# Patient Record
Sex: MALE
[De-identification: names, ages, dates, MRNs, and addresses within clinical notes are randomized per-mention and may not be internally consistent; named-entity substitution may affect disease eponyms.]

---

## 2020-01-01 ENCOUNTER — EXTERNAL RECORD (OUTPATIENT)
Dept: HEALTH INFORMATION MANAGEMENT | Facility: OTHER | Age: 65
End: 2020-01-01

## 2020-05-27 ENCOUNTER — TELEPHONE (OUTPATIENT)
Dept: CARDIOLOGY | Age: 65
End: 2020-05-27

## 2020-06-12 ENCOUNTER — APPOINTMENT (OUTPATIENT)
Dept: CARDIOLOGY | Age: 65
End: 2020-06-12

## 2020-06-18 ENCOUNTER — TELEPHONE (OUTPATIENT)
Dept: CARDIOLOGY | Age: 65
End: 2020-06-18

## 2020-06-19 ENCOUNTER — OFFICE VISIT (OUTPATIENT)
Dept: CARDIOLOGY | Age: 65
End: 2020-06-19

## 2020-06-19 VITALS
DIASTOLIC BLOOD PRESSURE: 70 MMHG | RESPIRATION RATE: 16 BRPM | HEIGHT: 63 IN | WEIGHT: 155.1 LBS | SYSTOLIC BLOOD PRESSURE: 124 MMHG | BODY MASS INDEX: 27.48 KG/M2 | HEART RATE: 76 BPM | TEMPERATURE: 98.2 F

## 2020-06-19 DIAGNOSIS — E11.9 TYPE 2 DIABETES MELLITUS WITHOUT COMPLICATION, WITHOUT LONG-TERM CURRENT USE OF INSULIN (CMD): ICD-10-CM

## 2020-06-19 DIAGNOSIS — I25.10 CORONARY ARTERIOSCLEROSIS IN NATIVE ARTERY: ICD-10-CM

## 2020-06-19 DIAGNOSIS — R07.2 PRECORDIAL PAIN: Primary | ICD-10-CM

## 2020-06-19 DIAGNOSIS — K21.9 GASTROESOPHAGEAL REFLUX DISEASE WITHOUT ESOPHAGITIS: ICD-10-CM

## 2020-06-19 DIAGNOSIS — I34.0 NONRHEUMATIC MITRAL VALVE REGURGITATION: ICD-10-CM

## 2020-06-19 DIAGNOSIS — E78.2 MIXED HYPERLIPIDEMIA: ICD-10-CM

## 2020-06-19 DIAGNOSIS — I10 ESSENTIAL HYPERTENSION: ICD-10-CM

## 2020-06-19 DIAGNOSIS — Z95.1 S/P CABG (CORONARY ARTERY BYPASS GRAFT): ICD-10-CM

## 2020-06-19 PROBLEM — I65.23 BILATERAL CAROTID ARTERY STENOSIS: Status: ACTIVE | Noted: 2020-06-19

## 2020-06-19 PROBLEM — E78.5 HYPERLIPIDEMIA: Status: ACTIVE | Noted: 2020-06-19

## 2020-06-19 PROCEDURE — 93000 ELECTROCARDIOGRAM COMPLETE: CPT | Performed by: INTERNAL MEDICINE

## 2020-06-19 PROCEDURE — 99204 OFFICE O/P NEW MOD 45 MIN: CPT | Performed by: INTERNAL MEDICINE

## 2020-06-19 RX ORDER — ATORVASTATIN CALCIUM 80 MG/1
80 TABLET, FILM COATED ORAL DAILY
COMMUNITY

## 2020-06-19 RX ORDER — OMEPRAZOLE 20 MG/1
20 CAPSULE, DELAYED RELEASE ORAL DAILY
COMMUNITY

## 2020-06-19 SDOH — HEALTH STABILITY: MENTAL HEALTH: HOW OFTEN DO YOU HAVE A DRINK CONTAINING ALCOHOL?: NEVER

## 2020-06-19 ASSESSMENT — ENCOUNTER SYMPTOMS
WEIGHT GAIN: 0
DIZZINESS: 1
CHILLS: 0
SUSPICIOUS LESIONS: 0
WEIGHT LOSS: 0
COUGH: 0
LIGHT-HEADEDNESS: 1
BRUISES/BLEEDS EASILY: 0
FEVER: 0
HEMATOCHEZIA: 0
HEMOPTYSIS: 0
ALLERGIC/IMMUNOLOGIC COMMENTS: NO NEW FOOD ALLERGIES

## 2020-06-26 ENCOUNTER — TELEPHONE (OUTPATIENT)
Dept: CARDIOLOGY | Age: 65
End: 2020-06-26

## 2020-12-21 ENCOUNTER — TELEPHONE (OUTPATIENT)
Dept: CARDIOLOGY | Age: 65
End: 2020-12-21

## 2020-12-22 ENCOUNTER — OFFICE VISIT (OUTPATIENT)
Dept: CARDIOLOGY | Age: 65
End: 2020-12-22

## 2020-12-22 ENCOUNTER — TELEPHONE (OUTPATIENT)
Dept: CARDIOLOGY | Age: 65
End: 2020-12-22

## 2020-12-22 VITALS
HEIGHT: 63 IN | DIASTOLIC BLOOD PRESSURE: 60 MMHG | SYSTOLIC BLOOD PRESSURE: 112 MMHG | BODY MASS INDEX: 27.55 KG/M2 | TEMPERATURE: 97.5 F | RESPIRATION RATE: 16 BRPM | HEART RATE: 79 BPM | WEIGHT: 155.5 LBS

## 2020-12-22 DIAGNOSIS — I34.0 NONRHEUMATIC MITRAL VALVE REGURGITATION: ICD-10-CM

## 2020-12-22 DIAGNOSIS — I20.9 ANGINA PECTORIS (CMD): Primary | ICD-10-CM

## 2020-12-22 DIAGNOSIS — E11.9 TYPE 2 DIABETES MELLITUS WITHOUT COMPLICATION, WITHOUT LONG-TERM CURRENT USE OF INSULIN (CMD): ICD-10-CM

## 2020-12-22 DIAGNOSIS — I65.23 BILATERAL CAROTID ARTERY STENOSIS: ICD-10-CM

## 2020-12-22 DIAGNOSIS — Z95.1 S/P CABG (CORONARY ARTERY BYPASS GRAFT): ICD-10-CM

## 2020-12-22 DIAGNOSIS — E78.2 MIXED HYPERLIPIDEMIA: ICD-10-CM

## 2020-12-22 DIAGNOSIS — I25.10 CORONARY ARTERIOSCLEROSIS IN NATIVE ARTERY: ICD-10-CM

## 2020-12-22 PROCEDURE — 99214 OFFICE O/P EST MOD 30 MIN: CPT | Performed by: INTERNAL MEDICINE

## 2020-12-22 PROCEDURE — 93000 ELECTROCARDIOGRAM COMPLETE: CPT | Performed by: INTERNAL MEDICINE

## 2020-12-22 RX ORDER — METOPROLOL SUCCINATE 25 MG/1
25 TABLET, EXTENDED RELEASE ORAL DAILY
Qty: 90 TABLET | Refills: 3 | Status: SHIPPED | OUTPATIENT
Start: 2020-12-22 | End: 2021-03-23 | Stop reason: SDUPTHER

## 2020-12-22 ASSESSMENT — ENCOUNTER SYMPTOMS
DIZZINESS: 0
COUGH: 0
WEIGHT GAIN: 0
FEVER: 0
LIGHT-HEADEDNESS: 0
WEIGHT LOSS: 0
HEMOPTYSIS: 0
BRUISES/BLEEDS EASILY: 0
SUSPICIOUS LESIONS: 0
ALLERGIC/IMMUNOLOGIC COMMENTS: NO NEW FOOD ALLERGIES
CHILLS: 0
HEMATOCHEZIA: 0

## 2021-01-20 ENCOUNTER — TELEPHONE (OUTPATIENT)
Dept: CARDIOLOGY | Age: 66
End: 2021-01-20

## 2021-03-23 ENCOUNTER — APPOINTMENT (OUTPATIENT)
Dept: CARDIOLOGY | Age: 66
End: 2021-03-23

## 2021-03-23 ENCOUNTER — OFFICE VISIT (OUTPATIENT)
Dept: CARDIOLOGY | Age: 66
End: 2021-03-23

## 2021-03-23 VITALS
DIASTOLIC BLOOD PRESSURE: 62 MMHG | WEIGHT: 156.3 LBS | HEIGHT: 63 IN | SYSTOLIC BLOOD PRESSURE: 110 MMHG | HEART RATE: 74 BPM | OXYGEN SATURATION: 100 % | BODY MASS INDEX: 27.7 KG/M2

## 2021-03-23 DIAGNOSIS — I20.9 ANGINA PECTORIS (CMD): Primary | ICD-10-CM

## 2021-03-23 DIAGNOSIS — I34.0 NONRHEUMATIC MITRAL VALVE REGURGITATION: ICD-10-CM

## 2021-03-23 DIAGNOSIS — I25.10 CORONARY ARTERIOSCLEROSIS IN NATIVE ARTERY: ICD-10-CM

## 2021-03-23 DIAGNOSIS — E11.9 TYPE 2 DIABETES MELLITUS WITHOUT COMPLICATION, WITHOUT LONG-TERM CURRENT USE OF INSULIN (CMD): ICD-10-CM

## 2021-03-23 DIAGNOSIS — E78.2 MIXED HYPERLIPIDEMIA: ICD-10-CM

## 2021-03-23 DIAGNOSIS — Z95.1 S/P CABG (CORONARY ARTERY BYPASS GRAFT): ICD-10-CM

## 2021-03-23 PROCEDURE — 99214 OFFICE O/P EST MOD 30 MIN: CPT | Performed by: INTERNAL MEDICINE

## 2021-03-23 RX ORDER — METOPROLOL SUCCINATE 50 MG/1
50 TABLET, EXTENDED RELEASE ORAL DAILY
Qty: 90 TABLET | Refills: 3 | Status: SHIPPED | OUTPATIENT
Start: 2021-03-23

## 2021-03-23 RX ORDER — RAMIPRIL 1.25 MG/1
1.25 CAPSULE ORAL DAILY
Qty: 90 CAPSULE | Refills: 3 | Status: SHIPPED | OUTPATIENT
Start: 2021-03-23

## 2021-03-23 SDOH — HEALTH STABILITY: MENTAL HEALTH: HOW OFTEN DO YOU HAVE A DRINK CONTAINING ALCOHOL?: NEVER

## 2021-03-23 ASSESSMENT — ENCOUNTER SYMPTOMS
DIZZINESS: 0
LIGHT-HEADEDNESS: 0
CHILLS: 0
SUSPICIOUS LESIONS: 0
HEMATOCHEZIA: 0
FEVER: 0
COUGH: 0
WEIGHT GAIN: 0
BRUISES/BLEEDS EASILY: 0
ALLERGIC/IMMUNOLOGIC COMMENTS: NO NEW FOOD ALLERGIES
WEIGHT LOSS: 0
HEMOPTYSIS: 0

## 2021-03-25 ENCOUNTER — APPOINTMENT (OUTPATIENT)
Dept: LAB | Age: 66
End: 2021-03-25

## 2021-06-22 ENCOUNTER — OFFICE VISIT (OUTPATIENT)
Dept: CARDIOLOGY | Age: 66
End: 2021-06-22

## 2021-06-22 VITALS
HEIGHT: 63 IN | BODY MASS INDEX: 27.82 KG/M2 | WEIGHT: 157 LBS | SYSTOLIC BLOOD PRESSURE: 102 MMHG | HEART RATE: 80 BPM | DIASTOLIC BLOOD PRESSURE: 54 MMHG

## 2021-06-22 DIAGNOSIS — Z95.1 S/P CABG (CORONARY ARTERY BYPASS GRAFT): ICD-10-CM

## 2021-06-22 DIAGNOSIS — I25.10 CORONARY ARTERIOSCLEROSIS IN NATIVE ARTERY: Primary | ICD-10-CM

## 2021-06-22 DIAGNOSIS — I34.0 NONRHEUMATIC MITRAL VALVE REGURGITATION: ICD-10-CM

## 2021-06-22 DIAGNOSIS — E11.9 TYPE 2 DIABETES MELLITUS WITHOUT COMPLICATION, WITHOUT LONG-TERM CURRENT USE OF INSULIN (CMD): ICD-10-CM

## 2021-06-22 DIAGNOSIS — E78.2 MIXED HYPERLIPIDEMIA: ICD-10-CM

## 2021-06-22 PROCEDURE — 99213 OFFICE O/P EST LOW 20 MIN: CPT | Performed by: INTERNAL MEDICINE

## 2021-06-22 ASSESSMENT — PATIENT HEALTH QUESTIONNAIRE - PHQ9
SUM OF ALL RESPONSES TO PHQ9 QUESTIONS 1 AND 2: 0
CLINICAL INTERPRETATION OF PHQ2 SCORE: NO FURTHER SCREENING NEEDED
SUM OF ALL RESPONSES TO PHQ9 QUESTIONS 1 AND 2: 0
1. LITTLE INTEREST OR PLEASURE IN DOING THINGS: NOT AT ALL
CLINICAL INTERPRETATION OF PHQ9 SCORE: NO FURTHER SCREENING NEEDED
2. FEELING DOWN, DEPRESSED OR HOPELESS: NOT AT ALL

## 2021-06-22 ASSESSMENT — ENCOUNTER SYMPTOMS
SYNCOPE: 0
LIGHT-HEADEDNESS: 0
HEARTBURN: 0
NUMBNESS: 0
NEAR-SYNCOPE: 0
HEADACHES: 0
MEMORY LOSS: 0
LOSS OF BALANCE: 0
DIZZINESS: 0
DIAPHORESIS: 0
POOR WOUND HEALING: 0
COLOR CHANGE: 0
VOMITING: 0
SHORTNESS OF BREATH: 0
SNORING: 0
VERTIGO: 0
NAUSEA: 0

## 2021-11-29 PROBLEM — Z86.69 HISTORY OF SLEEP APNEA: Chronic | Status: ACTIVE | Noted: 2021-11-29

## 2021-11-30 ENCOUNTER — APPOINTMENT (OUTPATIENT)
Dept: CARDIOLOGY | Age: 66
End: 2021-11-30

## 2022-03-03 ENCOUNTER — LAB ENCOUNTER (OUTPATIENT)
Dept: LAB | Age: 67
End: 2022-03-03
Attending: INTERNAL MEDICINE
Payer: MEDICARE

## 2022-03-03 DIAGNOSIS — Z95.1 POSTSURGICAL AORTOCORONARY BYPASS STATUS: Primary | ICD-10-CM

## 2022-03-03 DIAGNOSIS — I25.10 CORONARY ARTERY DISEASE: ICD-10-CM

## 2022-03-03 DIAGNOSIS — E78.5 HYPERLIPIDEMIA: ICD-10-CM

## 2022-03-03 LAB
ALBUMIN SERPL-MCNC: 3.9 G/DL (ref 3.4–5)
ALBUMIN/GLOB SERPL: 1.3 {RATIO} (ref 1–2)
ALP LIVER SERPL-CCNC: 88 U/L
ALT SERPL-CCNC: 46 U/L
ANION GAP SERPL CALC-SCNC: 4 MMOL/L (ref 0–18)
AST SERPL-CCNC: 16 U/L (ref 15–37)
BILIRUB SERPL-MCNC: 0.5 MG/DL (ref 0.1–2)
BUN BLD-MCNC: 18 MG/DL (ref 7–18)
BUN/CREAT SERPL: 20.9 (ref 10–20)
CALCIUM BLD-MCNC: 9.4 MG/DL (ref 8.5–10.1)
CHLORIDE SERPL-SCNC: 107 MMOL/L (ref 98–112)
CHOLEST SERPL-MCNC: 137 MG/DL (ref ?–200)
CO2 SERPL-SCNC: 28 MMOL/L (ref 21–32)
CREAT BLD-MCNC: 0.86 MG/DL
FASTING PATIENT LIPID ANSWER: YES
FASTING STATUS PATIENT QL REPORTED: YES
GLOBULIN PLAS-MCNC: 2.9 G/DL (ref 2.8–4.4)
GLUCOSE BLD-MCNC: 117 MG/DL (ref 70–99)
HDLC SERPL-MCNC: 38 MG/DL (ref 40–59)
LDLC SERPL CALC-MCNC: 79 MG/DL (ref ?–100)
NONHDLC SERPL-MCNC: 99 MG/DL (ref ?–130)
OSMOLALITY SERPL CALC.SUM OF ELEC: 291 MOSM/KG (ref 275–295)
POTASSIUM SERPL-SCNC: 4.3 MMOL/L (ref 3.5–5.1)
PROT SERPL-MCNC: 6.8 G/DL (ref 6.4–8.2)
SODIUM SERPL-SCNC: 139 MMOL/L (ref 136–145)
TRIGL SERPL-MCNC: 110 MG/DL (ref 30–149)
VLDLC SERPL CALC-MCNC: 17 MG/DL (ref 0–30)

## 2022-03-03 PROCEDURE — 80053 COMPREHEN METABOLIC PANEL: CPT

## 2022-03-03 PROCEDURE — 36415 COLL VENOUS BLD VENIPUNCTURE: CPT

## 2022-03-03 PROCEDURE — 80061 LIPID PANEL: CPT

## 2022-06-04 ENCOUNTER — OFFICE VISIT (OUTPATIENT)
Dept: FAMILY MEDICINE CLINIC | Facility: CLINIC | Age: 67
End: 2022-06-04
Payer: MEDICARE

## 2022-06-04 ENCOUNTER — LAB ENCOUNTER (OUTPATIENT)
Dept: LAB | Age: 67
End: 2022-06-04
Attending: NURSE PRACTITIONER
Payer: MEDICARE

## 2022-06-04 VITALS
DIASTOLIC BLOOD PRESSURE: 73 MMHG | WEIGHT: 158 LBS | SYSTOLIC BLOOD PRESSURE: 123 MMHG | HEART RATE: 67 BPM | BODY MASS INDEX: 28.71 KG/M2 | HEIGHT: 62.4 IN

## 2022-06-04 DIAGNOSIS — Z00.00 WELL ADULT EXAM: ICD-10-CM

## 2022-06-04 DIAGNOSIS — Z12.5 SCREENING FOR PROSTATE CANCER: ICD-10-CM

## 2022-06-04 DIAGNOSIS — Z00.00 WELL ADULT EXAM: Primary | ICD-10-CM

## 2022-06-04 DIAGNOSIS — E11.9 CONTROLLED TYPE 2 DIABETES MELLITUS WITHOUT COMPLICATION, WITHOUT LONG-TERM CURRENT USE OF INSULIN (HCC): ICD-10-CM

## 2022-06-04 DIAGNOSIS — H91.93 BILATERAL HEARING LOSS, UNSPECIFIED HEARING LOSS TYPE: ICD-10-CM

## 2022-06-04 DIAGNOSIS — Z95.1 HX OF CABG: ICD-10-CM

## 2022-06-04 LAB
ALBUMIN SERPL-MCNC: 3.9 G/DL (ref 3.4–5)
ALBUMIN/GLOB SERPL: 1.2 {RATIO} (ref 1–2)
ALP LIVER SERPL-CCNC: 89 U/L
ALT SERPL-CCNC: 43 U/L
ANION GAP SERPL CALC-SCNC: 5 MMOL/L (ref 0–18)
AST SERPL-CCNC: 19 U/L (ref 15–37)
BILIRUB SERPL-MCNC: 0.6 MG/DL (ref 0.1–2)
BILIRUB UR QL: NEGATIVE
BUN BLD-MCNC: 12 MG/DL (ref 7–18)
BUN/CREAT SERPL: 14.8 (ref 10–20)
CALCIUM BLD-MCNC: 9.6 MG/DL (ref 8.5–10.1)
CHLORIDE SERPL-SCNC: 108 MMOL/L (ref 98–112)
CHOLEST SERPL-MCNC: 110 MG/DL (ref ?–200)
CLARITY UR: CLEAR
CO2 SERPL-SCNC: 27 MMOL/L (ref 21–32)
COLOR UR: YELLOW
COMPLEXED PSA SERPL-MCNC: 1.35 NG/ML (ref ?–4)
CREAT BLD-MCNC: 0.81 MG/DL
CREAT UR-SCNC: 19 MG/DL
DEPRECATED RDW RBC AUTO: 42.5 FL (ref 35.1–46.3)
ERYTHROCYTE [DISTWIDTH] IN BLOOD BY AUTOMATED COUNT: 12.5 % (ref 11–15)
EST. AVERAGE GLUCOSE BLD GHB EST-MCNC: 131 MG/DL (ref 68–126)
FASTING PATIENT LIPID ANSWER: YES
FASTING STATUS PATIENT QL REPORTED: YES
GLOBULIN PLAS-MCNC: 3.3 G/DL (ref 2.8–4.4)
GLUCOSE BLD-MCNC: 109 MG/DL (ref 70–99)
GLUCOSE UR-MCNC: NEGATIVE MG/DL
HBA1C MFR BLD: 6.2 % (ref ?–5.7)
HCT VFR BLD AUTO: 40.1 %
HDLC SERPL-MCNC: 35 MG/DL (ref 40–59)
HGB BLD-MCNC: 12.8 G/DL
HGB UR QL STRIP.AUTO: NEGATIVE
KETONES UR-MCNC: NEGATIVE MG/DL
LDLC SERPL CALC-MCNC: 58 MG/DL (ref ?–100)
LEUKOCYTE ESTERASE UR QL STRIP.AUTO: NEGATIVE
MCH RBC QN AUTO: 29.5 PG (ref 26–34)
MCHC RBC AUTO-ENTMCNC: 31.9 G/DL (ref 31–37)
MCV RBC AUTO: 92.4 FL
MICROALBUMIN UR-MCNC: <0.5 MG/DL
NITRITE UR QL STRIP.AUTO: NEGATIVE
NONHDLC SERPL-MCNC: 75 MG/DL (ref ?–130)
OSMOLALITY SERPL CALC.SUM OF ELEC: 290 MOSM/KG (ref 275–295)
PH UR: 7 [PH] (ref 5–8)
PLATELET # BLD AUTO: 168 10(3)UL (ref 150–450)
POTASSIUM SERPL-SCNC: 4.6 MMOL/L (ref 3.5–5.1)
PROT SERPL-MCNC: 7.2 G/DL (ref 6.4–8.2)
PROT UR-MCNC: NEGATIVE MG/DL
RBC # BLD AUTO: 4.34 X10(6)UL
SODIUM SERPL-SCNC: 140 MMOL/L (ref 136–145)
SP GR UR STRIP: 1.01 (ref 1–1.03)
TRIGL SERPL-MCNC: 85 MG/DL (ref 30–149)
TSI SER-ACNC: 0.77 MIU/ML (ref 0.36–3.74)
UROBILINOGEN UR STRIP-ACNC: <2
VIT B12 SERPL-MCNC: 411 PG/ML (ref 193–986)
VIT C UR-MCNC: NEGATIVE MG/DL
VLDLC SERPL CALC-MCNC: 12 MG/DL (ref 0–30)
WBC # BLD AUTO: 6.9 X10(3) UL (ref 4–11)

## 2022-06-04 PROCEDURE — 82043 UR ALBUMIN QUANTITATIVE: CPT

## 2022-06-04 PROCEDURE — 82570 ASSAY OF URINE CREATININE: CPT

## 2022-06-04 PROCEDURE — 83036 HEMOGLOBIN GLYCOSYLATED A1C: CPT

## 2022-06-04 PROCEDURE — 80053 COMPREHEN METABOLIC PANEL: CPT

## 2022-06-04 PROCEDURE — 85027 COMPLETE CBC AUTOMATED: CPT

## 2022-06-04 PROCEDURE — 36415 COLL VENOUS BLD VENIPUNCTURE: CPT

## 2022-06-04 PROCEDURE — 80061 LIPID PANEL: CPT

## 2022-06-04 PROCEDURE — 99204 OFFICE O/P NEW MOD 45 MIN: CPT | Performed by: NURSE PRACTITIONER

## 2022-06-04 PROCEDURE — 84443 ASSAY THYROID STIM HORMONE: CPT

## 2022-06-04 PROCEDURE — 81003 URINALYSIS AUTO W/O SCOPE: CPT | Performed by: NURSE PRACTITIONER

## 2022-06-04 PROCEDURE — 82607 VITAMIN B-12: CPT

## 2022-06-04 RX ORDER — RAMIPRIL 1.25 MG/1
CAPSULE ORAL
COMMUNITY
Start: 2021-03-23

## 2022-06-04 RX ORDER — ATORVASTATIN CALCIUM 80 MG/1
80 TABLET, FILM COATED ORAL DAILY
COMMUNITY
Start: 2022-05-15

## 2022-06-04 RX ORDER — OMEPRAZOLE 40 MG/1
40 CAPSULE, DELAYED RELEASE ORAL AS DIRECTED
COMMUNITY

## 2022-06-04 RX ORDER — METOPROLOL TARTRATE 50 MG/1
TABLET, FILM COATED ORAL
COMMUNITY

## 2022-06-05 PROBLEM — Z95.1 HX OF CABG: Status: ACTIVE | Noted: 2022-06-05

## 2022-06-05 PROBLEM — H91.93 BILATERAL HEARING LOSS: Status: ACTIVE | Noted: 2022-06-05

## 2022-06-05 NOTE — ASSESSMENT & PLAN NOTE
Discussed referral to audiology for hearing eval  Will consider-son is looking to add to their medicare by getting a supplement

## 2022-06-05 NOTE — ASSESSMENT & PLAN NOTE
It is essential that you decrease the amount of carbohydrates that you ingest (bread products, rice, pasta, potatoes, starchy vegetables and sugary beverages). Also try to increase the amount of vegetables and protein that you eat daily. Decrease the amount of processed and fast foods. Try to exercise at least 30 minutes per day, 4-5 times per week.    Check diabetic labs Patient/Caregiver given discharge instructions and they have confirmed that 
they understand the instructions.  Patient ambulatory with steady gait.

## 2022-06-05 NOTE — ASSESSMENT & PLAN NOTE
Please aim to eat a diet high in fresh fruits and vegetables, lean protein sources, complex carbohydrates and limited processed and fast foods. Try to get at least 150 minutes of exercise per week-a combination of weight resistance and cardio is preferred. screeing labs  Return for medicare physical  Request prior records to update care gaps.

## 2022-06-06 ENCOUNTER — PATIENT MESSAGE (OUTPATIENT)
Dept: FAMILY MEDICINE CLINIC | Facility: CLINIC | Age: 67
End: 2022-06-06

## 2022-06-06 DIAGNOSIS — H91.90 HEARING LOSS, UNSPECIFIED HEARING LOSS TYPE, UNSPECIFIED LATERALITY: Primary | ICD-10-CM

## 2022-06-07 ENCOUNTER — PATIENT MESSAGE (OUTPATIENT)
Dept: FAMILY MEDICINE CLINIC | Facility: CLINIC | Age: 67
End: 2022-06-07

## 2022-06-08 RX ORDER — BLOOD SUGAR DIAGNOSTIC
1 STRIP MISCELLANEOUS DAILY
Qty: 100 EACH | Refills: 3 | Status: SHIPPED | OUTPATIENT
Start: 2022-06-08

## 2022-06-08 RX ORDER — LANCETS 28 GAUGE
1 EACH MISCELLANEOUS DAILY
Qty: 100 EACH | Refills: 3 | Status: SHIPPED | OUTPATIENT
Start: 2022-06-08

## 2022-06-08 NOTE — TELEPHONE ENCOUNTER
From: Marsha Walsh  To: VINNIE Rosas  Sent: 6/7/2022 2:16 PM CDT  Subject: Daily Sugar Testing    Hi Jamey Cogan - My previous PCP had me test my blood sugar level on a daily basis. I have \"one touch ultra blue test strips\" and \"microlet lancets\" for another week. Can you please issue a prescription so I can continue testing daily?     Thank you

## 2022-06-17 ENCOUNTER — OFFICE VISIT (OUTPATIENT)
Dept: OTOLARYNGOLOGY | Facility: CLINIC | Age: 67
End: 2022-06-17
Payer: MEDICARE

## 2022-06-17 ENCOUNTER — OFFICE VISIT (OUTPATIENT)
Dept: AUDIOLOGY | Facility: CLINIC | Age: 67
End: 2022-06-17
Payer: MEDICARE

## 2022-06-17 VITALS — HEIGHT: 62.4 IN | WEIGHT: 158 LBS | BODY MASS INDEX: 28.71 KG/M2

## 2022-06-17 DIAGNOSIS — H90.3 SENSORINEURAL HEARING LOSS, BILATERAL: Primary | ICD-10-CM

## 2022-06-17 DIAGNOSIS — H91.90 HEARING LOSS, UNSPECIFIED HEARING LOSS TYPE, UNSPECIFIED LATERALITY: Primary | ICD-10-CM

## 2022-06-17 PROCEDURE — 92567 TYMPANOMETRY: CPT | Performed by: AUDIOLOGIST

## 2022-06-17 PROCEDURE — 92557 COMPREHENSIVE HEARING TEST: CPT | Performed by: AUDIOLOGIST

## 2022-06-17 PROCEDURE — 99203 OFFICE O/P NEW LOW 30 MIN: CPT | Performed by: OTOLARYNGOLOGY

## 2022-08-01 ENCOUNTER — APPOINTMENT (OUTPATIENT)
Dept: GENERAL RADIOLOGY | Age: 67
End: 2022-08-01
Payer: MEDICARE

## 2022-08-01 ENCOUNTER — HOSPITAL ENCOUNTER (OUTPATIENT)
Age: 67
Discharge: HOME | End: 2022-08-01
Payer: MEDICARE

## 2022-08-01 VITALS
RESPIRATION RATE: 18 BRPM | HEART RATE: 63 BPM | SYSTOLIC BLOOD PRESSURE: 120 MMHG | TEMPERATURE: 97 F | OXYGEN SATURATION: 99 % | DIASTOLIC BLOOD PRESSURE: 63 MMHG

## 2022-08-01 DIAGNOSIS — S62.111A TRIQUETRAL CHIP FRACTURE, RIGHT, CLOSED, INITIAL ENCOUNTER: ICD-10-CM

## 2022-08-01 DIAGNOSIS — L03.115 CELLULITIS OF RIGHT LOWER EXTREMITY: ICD-10-CM
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
## 2022-08-01 LAB
BILIRUB UR QL STRIP: NEGATIVE
CLARITY UR: CLEAR
COLOR UR: YELLOW
GLUCOSE UR STRIP-MCNC: NEGATIVE MG/DL
HGB UR QL STRIP: NEGATIVE
KETONES UR STRIP-MCNC: NEGATIVE MG/DL
LEUKOCYTE ESTERASE UR QL STRIP: NEGATIVE
NITRITE UR QL STRIP: NEGATIVE
PH UR STRIP: 7 [PH]
PROT UR STRIP-MCNC: NEGATIVE MG/DL
SP GR UR STRIP: 1.01
UROBILINOGEN UR STRIP-ACNC: <2 MG/DL

## 2022-08-01 PROCEDURE — 73560 X-RAY EXAM OF KNEE 1 OR 2: CPT

## 2022-08-01 PROCEDURE — 71101 X-RAY EXAM UNILAT RIBS/CHEST: CPT

## 2022-08-01 PROCEDURE — 29125 APPL SHORT ARM SPLINT STATIC: CPT

## 2022-08-01 PROCEDURE — 73030 X-RAY EXAM OF SHOULDER: CPT

## 2022-08-01 PROCEDURE — 99204 OFFICE O/P NEW MOD 45 MIN: CPT

## 2022-08-01 PROCEDURE — 73080 X-RAY EXAM OF ELBOW: CPT

## 2022-08-01 PROCEDURE — A4565 SLINGS: HCPCS

## 2022-08-01 PROCEDURE — 81002 URINALYSIS NONAUTO W/O SCOPE: CPT

## 2022-08-01 PROCEDURE — 73110 X-RAY EXAM OF WRIST: CPT

## 2022-08-01 RX ORDER — CEPHALEXIN 500 MG/1
500 CAPSULE ORAL 4 TIMES DAILY
Qty: 40 CAPSULE | Refills: 0 | Status: SHIPPED | OUTPATIENT
Start: 2022-08-01 | End: 2022-08-11

## 2022-08-02 ENCOUNTER — TELEPHONE (OUTPATIENT)
Dept: ORTHOPEDICS CLINIC | Facility: CLINIC | Age: 67
End: 2022-08-02

## 2022-08-02 NOTE — TELEPHONE ENCOUNTER
Pt called stating pt went to the immediate care 8-1-22 for chip fracture of right wrist.  Pt requesting an appointment.   Please call

## 2022-08-09 ENCOUNTER — OFFICE VISIT (OUTPATIENT)
Dept: ORTHOPEDICS CLINIC | Facility: CLINIC | Age: 67
End: 2022-08-09
Payer: MEDICARE

## 2022-08-09 DIAGNOSIS — M80.041D: ICD-10-CM

## 2022-08-09 DIAGNOSIS — S62.111A TRIQUETRAL CHIP FRACTURE, RIGHT, CLOSED, INITIAL ENCOUNTER: Primary | ICD-10-CM

## 2022-08-09 PROCEDURE — 25630 CLTX CARPL FX W/O MNPJ EA B1: CPT | Performed by: ORTHOPAEDIC SURGERY

## 2022-08-09 PROCEDURE — 99204 OFFICE O/P NEW MOD 45 MIN: CPT | Performed by: ORTHOPAEDIC SURGERY

## 2022-08-09 PROCEDURE — L3908 WHO COCK-UP NONMOLDE PRE OTS: HCPCS | Performed by: ORTHOPAEDIC SURGERY

## 2022-08-09 RX ORDER — METOPROLOL SUCCINATE 50 MG/1
50 TABLET, EXTENDED RELEASE ORAL DAILY
COMMUNITY
Start: 2022-07-10

## 2022-08-30 ENCOUNTER — OFFICE VISIT (OUTPATIENT)
Dept: ORTHOPEDICS CLINIC | Facility: CLINIC | Age: 67
End: 2022-08-30
Payer: MEDICARE

## 2022-08-30 ENCOUNTER — HOSPITAL ENCOUNTER (OUTPATIENT)
Dept: GENERAL RADIOLOGY | Facility: HOSPITAL | Age: 67
Discharge: HOME | End: 2022-08-30
Attending: ORTHOPAEDIC SURGERY
Payer: MEDICARE

## 2022-08-30 DIAGNOSIS — T14.8XXA FRACTURE: ICD-10-CM

## 2022-08-30 DIAGNOSIS — M80.041D: ICD-10-CM

## 2022-08-30 DIAGNOSIS — S62.111A TRIQUETRAL CHIP FRACTURE, RIGHT, CLOSED, INITIAL ENCOUNTER: Primary | ICD-10-CM

## 2022-08-30 PROCEDURE — 73110 X-RAY EXAM OF WRIST: CPT | Performed by: ORTHOPAEDIC SURGERY

## 2022-08-30 PROCEDURE — 99024 POSTOP FOLLOW-UP VISIT: CPT | Performed by: ORTHOPAEDIC SURGERY

## 2022-09-06 ENCOUNTER — TELEPHONE (OUTPATIENT)
Dept: PHYSICAL THERAPY | Facility: HOSPITAL | Age: 67
End: 2022-09-06

## 2022-09-07 ENCOUNTER — APPOINTMENT (OUTPATIENT)
Dept: PHYSICAL THERAPY | Age: 67
End: 2022-09-07
Attending: ORTHOPAEDIC SURGERY
Payer: MEDICARE

## 2022-09-07 ENCOUNTER — TELEPHONE (OUTPATIENT)
Dept: PHYSICAL THERAPY | Facility: HOSPITAL | Age: 67
End: 2022-09-07

## 2022-09-07 ENCOUNTER — ORDER TRANSCRIPTION (OUTPATIENT)
Dept: PHYSICAL THERAPY | Facility: HOSPITAL | Age: 67
End: 2022-09-07

## 2022-09-07 DIAGNOSIS — T14.8XXA FRACTURE: Primary | ICD-10-CM

## 2022-09-07 DIAGNOSIS — S62.111A TRIQUETRAL CHIP FRACTURE, RIGHT, CLOSED, INITIAL ENCOUNTER: ICD-10-CM

## 2022-09-12 ENCOUNTER — APPOINTMENT (OUTPATIENT)
Dept: PHYSICAL THERAPY | Age: 67
End: 2022-09-12
Attending: ORTHOPAEDIC SURGERY
Payer: MEDICARE

## 2022-09-12 ENCOUNTER — OFFICE VISIT (OUTPATIENT)
Dept: PHYSICAL THERAPY | Age: 67
End: 2022-09-12
Attending: ORTHOPAEDIC SURGERY
Payer: MEDICARE

## 2022-09-12 DIAGNOSIS — T14.8XXA FRACTURE: ICD-10-CM

## 2022-09-12 DIAGNOSIS — S62.111A TRIQUETRAL CHIP FRACTURE, RIGHT, CLOSED, INITIAL ENCOUNTER: ICD-10-CM

## 2022-09-12 PROCEDURE — 97165 OT EVAL LOW COMPLEX 30 MIN: CPT

## 2022-09-12 PROCEDURE — 97110 THERAPEUTIC EXERCISES: CPT

## 2022-09-14 ENCOUNTER — OFFICE VISIT (OUTPATIENT)
Dept: PHYSICAL THERAPY | Age: 67
End: 2022-09-14
Attending: ORTHOPAEDIC SURGERY
Payer: MEDICARE

## 2022-09-14 ENCOUNTER — APPOINTMENT (OUTPATIENT)
Dept: PHYSICAL THERAPY | Age: 67
End: 2022-09-14
Attending: ORTHOPAEDIC SURGERY
Payer: MEDICARE

## 2022-09-14 PROCEDURE — 97140 MANUAL THERAPY 1/> REGIONS: CPT

## 2022-09-14 PROCEDURE — 97110 THERAPEUTIC EXERCISES: CPT

## 2022-09-19 ENCOUNTER — OFFICE VISIT (OUTPATIENT)
Dept: PHYSICAL THERAPY | Age: 67
End: 2022-09-19
Attending: ORTHOPAEDIC SURGERY
Payer: MEDICARE

## 2022-09-19 ENCOUNTER — APPOINTMENT (OUTPATIENT)
Dept: PHYSICAL THERAPY | Age: 67
End: 2022-09-19
Attending: ORTHOPAEDIC SURGERY
Payer: MEDICARE

## 2022-09-19 PROCEDURE — 97530 THERAPEUTIC ACTIVITIES: CPT

## 2022-09-19 PROCEDURE — 97140 MANUAL THERAPY 1/> REGIONS: CPT

## 2022-09-19 PROCEDURE — 97110 THERAPEUTIC EXERCISES: CPT

## 2022-09-21 ENCOUNTER — OFFICE VISIT (OUTPATIENT)
Dept: PHYSICAL THERAPY | Age: 67
End: 2022-09-21
Attending: ORTHOPAEDIC SURGERY

## 2022-09-21 ENCOUNTER — APPOINTMENT (OUTPATIENT)
Dept: PHYSICAL THERAPY | Age: 67
End: 2022-09-21
Attending: ORTHOPAEDIC SURGERY
Payer: MEDICARE

## 2022-09-21 PROCEDURE — 97110 THERAPEUTIC EXERCISES: CPT

## 2022-09-21 PROCEDURE — 97530 THERAPEUTIC ACTIVITIES: CPT

## 2022-09-21 PROCEDURE — 97112 NEUROMUSCULAR REEDUCATION: CPT

## 2022-09-23 ENCOUNTER — OFFICE VISIT (OUTPATIENT)
Dept: PHYSICAL THERAPY | Age: 67
End: 2022-09-23
Attending: ORTHOPAEDIC SURGERY

## 2022-09-23 DIAGNOSIS — M24.611 ARTHROFIBROSIS OF RIGHT SHOULDER: ICD-10-CM

## 2022-09-23 PROCEDURE — 97162 PT EVAL MOD COMPLEX 30 MIN: CPT | Performed by: PHYSICAL THERAPIST

## 2022-09-23 PROCEDURE — 97110 THERAPEUTIC EXERCISES: CPT | Performed by: PHYSICAL THERAPIST

## 2022-09-26 ENCOUNTER — APPOINTMENT (OUTPATIENT)
Dept: PHYSICAL THERAPY | Age: 67
End: 2022-09-26
Attending: ORTHOPAEDIC SURGERY
Payer: MEDICARE

## 2022-09-26 ENCOUNTER — OFFICE VISIT (OUTPATIENT)
Dept: PHYSICAL THERAPY | Age: 67
End: 2022-09-26
Attending: ORTHOPAEDIC SURGERY

## 2022-09-26 PROCEDURE — 97530 THERAPEUTIC ACTIVITIES: CPT

## 2022-09-26 PROCEDURE — 97110 THERAPEUTIC EXERCISES: CPT

## 2022-09-26 PROCEDURE — 97140 MANUAL THERAPY 1/> REGIONS: CPT

## 2022-09-27 ENCOUNTER — OFFICE VISIT (OUTPATIENT)
Dept: PHYSICAL THERAPY | Age: 67
End: 2022-09-27
Attending: ORTHOPAEDIC SURGERY

## 2022-09-27 PROCEDURE — 97110 THERAPEUTIC EXERCISES: CPT | Performed by: PHYSICAL THERAPIST

## 2022-09-28 ENCOUNTER — APPOINTMENT (OUTPATIENT)
Dept: PHYSICAL THERAPY | Age: 67
End: 2022-09-28
Attending: ORTHOPAEDIC SURGERY
Payer: MEDICARE

## 2022-09-28 ENCOUNTER — OFFICE VISIT (OUTPATIENT)
Dept: PHYSICAL THERAPY | Age: 67
End: 2022-09-28
Attending: ORTHOPAEDIC SURGERY

## 2022-09-28 PROCEDURE — 97140 MANUAL THERAPY 1/> REGIONS: CPT

## 2022-09-28 PROCEDURE — 97110 THERAPEUTIC EXERCISES: CPT

## 2022-09-30 ENCOUNTER — OFFICE VISIT (OUTPATIENT)
Dept: PHYSICAL THERAPY | Age: 67
End: 2022-09-30
Attending: ORTHOPAEDIC SURGERY

## 2022-09-30 PROCEDURE — 97110 THERAPEUTIC EXERCISES: CPT | Performed by: PHYSICAL THERAPIST

## 2022-10-03 ENCOUNTER — OFFICE VISIT (OUTPATIENT)
Dept: PHYSICAL THERAPY | Age: 67
End: 2022-10-03
Attending: ORTHOPAEDIC SURGERY
Payer: MEDICARE

## 2022-10-03 ENCOUNTER — APPOINTMENT (OUTPATIENT)
Dept: PHYSICAL THERAPY | Age: 67
End: 2022-10-03
Attending: ORTHOPAEDIC SURGERY
Payer: MEDICARE

## 2022-10-03 PROCEDURE — 97110 THERAPEUTIC EXERCISES: CPT

## 2022-10-03 PROCEDURE — 97140 MANUAL THERAPY 1/> REGIONS: CPT

## 2022-10-04 ENCOUNTER — OFFICE VISIT (OUTPATIENT)
Dept: PHYSICAL THERAPY | Age: 67
End: 2022-10-04
Attending: ORTHOPAEDIC SURGERY
Payer: MEDICARE

## 2022-10-04 PROCEDURE — 97110 THERAPEUTIC EXERCISES: CPT | Performed by: PHYSICAL THERAPIST

## 2022-10-05 ENCOUNTER — OFFICE VISIT (OUTPATIENT)
Dept: PHYSICAL THERAPY | Age: 67
End: 2022-10-05
Attending: ORTHOPAEDIC SURGERY
Payer: MEDICARE

## 2022-10-05 ENCOUNTER — APPOINTMENT (OUTPATIENT)
Dept: PHYSICAL THERAPY | Age: 67
End: 2022-10-05
Attending: ORTHOPAEDIC SURGERY
Payer: MEDICARE

## 2022-10-05 PROCEDURE — 97110 THERAPEUTIC EXERCISES: CPT

## 2022-10-05 PROCEDURE — 97140 MANUAL THERAPY 1/> REGIONS: CPT

## 2022-10-07 ENCOUNTER — OFFICE VISIT (OUTPATIENT)
Dept: PHYSICAL THERAPY | Age: 67
End: 2022-10-07
Attending: ORTHOPAEDIC SURGERY
Payer: MEDICARE

## 2022-10-07 PROCEDURE — 97110 THERAPEUTIC EXERCISES: CPT | Performed by: PHYSICAL THERAPIST

## 2022-10-11 ENCOUNTER — OFFICE VISIT (OUTPATIENT)
Dept: PHYSICAL THERAPY | Age: 67
End: 2022-10-11
Attending: ORTHOPAEDIC SURGERY
Payer: MEDICARE

## 2022-10-11 ENCOUNTER — APPOINTMENT (OUTPATIENT)
Dept: PHYSICAL THERAPY | Age: 67
End: 2022-10-11
Payer: MEDICARE

## 2022-10-11 PROCEDURE — 97110 THERAPEUTIC EXERCISES: CPT | Performed by: PHYSICAL THERAPIST

## 2022-10-12 ENCOUNTER — OFFICE VISIT (OUTPATIENT)
Dept: PHYSICAL THERAPY | Age: 67
End: 2022-10-12
Attending: ORTHOPAEDIC SURGERY
Payer: MEDICARE

## 2022-10-12 PROCEDURE — 97140 MANUAL THERAPY 1/> REGIONS: CPT

## 2022-10-12 PROCEDURE — 97110 THERAPEUTIC EXERCISES: CPT

## 2022-10-13 ENCOUNTER — OFFICE VISIT (OUTPATIENT)
Dept: PHYSICAL THERAPY | Age: 67
End: 2022-10-13
Payer: MEDICARE

## 2022-10-13 PROCEDURE — 97530 THERAPEUTIC ACTIVITIES: CPT

## 2022-10-13 PROCEDURE — 97140 MANUAL THERAPY 1/> REGIONS: CPT

## 2022-10-13 PROCEDURE — 97110 THERAPEUTIC EXERCISES: CPT

## 2022-10-17 ENCOUNTER — OFFICE VISIT (OUTPATIENT)
Dept: PHYSICAL THERAPY | Age: 67
End: 2022-10-17
Payer: MEDICARE

## 2022-10-17 PROCEDURE — 97110 THERAPEUTIC EXERCISES: CPT | Performed by: PHYSICAL THERAPIST

## 2022-10-19 ENCOUNTER — OFFICE VISIT (OUTPATIENT)
Dept: PHYSICAL THERAPY | Age: 67
End: 2022-10-19
Payer: MEDICARE

## 2022-10-19 PROCEDURE — 97140 MANUAL THERAPY 1/> REGIONS: CPT

## 2022-10-19 PROCEDURE — 97530 THERAPEUTIC ACTIVITIES: CPT

## 2022-10-19 PROCEDURE — 97110 THERAPEUTIC EXERCISES: CPT

## 2022-10-21 ENCOUNTER — OFFICE VISIT (OUTPATIENT)
Dept: PHYSICAL THERAPY | Age: 67
End: 2022-10-21
Payer: MEDICARE

## 2022-10-21 PROCEDURE — 97530 THERAPEUTIC ACTIVITIES: CPT

## 2022-10-21 PROCEDURE — 97110 THERAPEUTIC EXERCISES: CPT

## 2022-10-21 PROCEDURE — 97140 MANUAL THERAPY 1/> REGIONS: CPT

## 2022-10-25 ENCOUNTER — OFFICE VISIT (OUTPATIENT)
Dept: PHYSICAL THERAPY | Age: 67
End: 2022-10-25
Payer: MEDICARE

## 2022-10-25 PROCEDURE — 97110 THERAPEUTIC EXERCISES: CPT | Performed by: PHYSICAL THERAPIST

## 2022-10-26 ENCOUNTER — OFFICE VISIT (OUTPATIENT)
Dept: PHYSICAL THERAPY | Age: 67
End: 2022-10-26
Payer: MEDICARE

## 2022-10-26 PROCEDURE — 97110 THERAPEUTIC EXERCISES: CPT

## 2022-10-26 PROCEDURE — 97140 MANUAL THERAPY 1/> REGIONS: CPT

## 2022-10-26 PROCEDURE — 97530 THERAPEUTIC ACTIVITIES: CPT

## 2022-10-27 ENCOUNTER — OFFICE VISIT (OUTPATIENT)
Dept: PHYSICAL THERAPY | Age: 67
End: 2022-10-27
Attending: ORTHOPAEDIC SURGERY
Payer: MEDICARE

## 2022-10-27 PROCEDURE — 97530 THERAPEUTIC ACTIVITIES: CPT | Performed by: PHYSICAL THERAPIST

## 2022-10-27 PROCEDURE — 97110 THERAPEUTIC EXERCISES: CPT | Performed by: PHYSICAL THERAPIST

## 2022-10-28 ENCOUNTER — OFFICE VISIT (OUTPATIENT)
Dept: PHYSICAL THERAPY | Age: 67
End: 2022-10-28
Payer: MEDICARE

## 2022-10-28 PROCEDURE — 97140 MANUAL THERAPY 1/> REGIONS: CPT

## 2022-10-28 PROCEDURE — 97110 THERAPEUTIC EXERCISES: CPT

## 2022-11-01 ENCOUNTER — OFFICE VISIT (OUTPATIENT)
Dept: PHYSICAL THERAPY | Age: 67
End: 2022-11-01
Payer: MEDICARE

## 2022-11-01 PROCEDURE — 97110 THERAPEUTIC EXERCISES: CPT | Performed by: PHYSICAL THERAPIST

## 2022-11-02 ENCOUNTER — OFFICE VISIT (OUTPATIENT)
Dept: PHYSICAL THERAPY | Age: 67
End: 2022-11-02
Payer: MEDICARE

## 2022-11-02 PROCEDURE — 97140 MANUAL THERAPY 1/> REGIONS: CPT

## 2022-11-02 PROCEDURE — 97530 THERAPEUTIC ACTIVITIES: CPT

## 2022-11-02 PROCEDURE — 97110 THERAPEUTIC EXERCISES: CPT

## 2022-11-03 ENCOUNTER — OFFICE VISIT (OUTPATIENT)
Dept: PHYSICAL THERAPY | Age: 67
End: 2022-11-03
Payer: MEDICARE

## 2022-11-03 PROCEDURE — 97530 THERAPEUTIC ACTIVITIES: CPT | Performed by: PHYSICAL THERAPIST

## 2022-11-03 PROCEDURE — 97110 THERAPEUTIC EXERCISES: CPT | Performed by: PHYSICAL THERAPIST

## 2022-11-04 ENCOUNTER — OFFICE VISIT (OUTPATIENT)
Dept: PHYSICAL THERAPY | Age: 67
End: 2022-11-04
Payer: MEDICARE

## 2022-11-04 PROCEDURE — 97140 MANUAL THERAPY 1/> REGIONS: CPT

## 2022-11-04 PROCEDURE — 97110 THERAPEUTIC EXERCISES: CPT

## 2022-11-08 ENCOUNTER — OFFICE VISIT (OUTPATIENT)
Dept: PHYSICAL THERAPY | Age: 67
End: 2022-11-08
Payer: MEDICARE

## 2022-11-08 PROCEDURE — 97110 THERAPEUTIC EXERCISES: CPT

## 2022-11-08 PROCEDURE — 97140 MANUAL THERAPY 1/> REGIONS: CPT

## 2022-11-09 ENCOUNTER — OFFICE VISIT (OUTPATIENT)
Dept: PHYSICAL THERAPY | Age: 67
End: 2022-11-09
Payer: MEDICARE

## 2022-11-09 PROCEDURE — 97110 THERAPEUTIC EXERCISES: CPT | Performed by: PHYSICAL THERAPIST

## 2022-11-09 PROCEDURE — 97530 THERAPEUTIC ACTIVITIES: CPT | Performed by: PHYSICAL THERAPIST

## 2022-11-10 ENCOUNTER — OFFICE VISIT (OUTPATIENT)
Dept: PHYSICAL THERAPY | Age: 67
End: 2022-11-10
Payer: MEDICARE

## 2022-11-10 PROCEDURE — 97140 MANUAL THERAPY 1/> REGIONS: CPT

## 2022-11-10 PROCEDURE — 97110 THERAPEUTIC EXERCISES: CPT

## 2022-11-11 ENCOUNTER — OFFICE VISIT (OUTPATIENT)
Dept: PHYSICAL THERAPY | Age: 67
End: 2022-11-11
Payer: MEDICARE

## 2022-11-11 PROCEDURE — 97530 THERAPEUTIC ACTIVITIES: CPT | Performed by: PHYSICAL THERAPIST

## 2022-11-11 PROCEDURE — 97110 THERAPEUTIC EXERCISES: CPT | Performed by: PHYSICAL THERAPIST

## 2022-11-15 ENCOUNTER — APPOINTMENT (OUTPATIENT)
Dept: PHYSICAL THERAPY | Age: 67
End: 2022-11-15
Payer: MEDICARE

## 2022-11-16 ENCOUNTER — APPOINTMENT (OUTPATIENT)
Dept: PHYSICAL THERAPY | Age: 67
End: 2022-11-16
Payer: MEDICARE

## 2022-11-17 ENCOUNTER — APPOINTMENT (OUTPATIENT)
Dept: PHYSICAL THERAPY | Age: 67
End: 2022-11-17
Payer: MEDICARE

## 2022-11-18 ENCOUNTER — APPOINTMENT (OUTPATIENT)
Dept: PHYSICAL THERAPY | Age: 67
End: 2022-11-18
Payer: MEDICARE

## 2023-01-12 RX ORDER — RAMIPRIL 1.25 MG/1
1.25 CAPSULE ORAL DAILY
Qty: 90 CAPSULE | Refills: 0 | Status: SHIPPED | OUTPATIENT
Start: 2023-01-12

## 2023-01-12 RX ORDER — METOPROLOL SUCCINATE 50 MG/1
50 TABLET, EXTENDED RELEASE ORAL DAILY
Qty: 90 TABLET | Refills: 0 | Status: SHIPPED | OUTPATIENT
Start: 2023-01-12

## 2023-03-03 ENCOUNTER — LAB ENCOUNTER (OUTPATIENT)
Dept: LAB | Age: 68
End: 2023-03-03
Attending: NURSE PRACTITIONER
Payer: MEDICARE

## 2023-03-03 ENCOUNTER — PATIENT MESSAGE (OUTPATIENT)
Dept: FAMILY MEDICINE CLINIC | Facility: CLINIC | Age: 68
End: 2023-03-03

## 2023-03-03 ENCOUNTER — OFFICE VISIT (OUTPATIENT)
Dept: FAMILY MEDICINE CLINIC | Facility: CLINIC | Age: 68
End: 2023-03-03

## 2023-03-03 VITALS
HEIGHT: 62.4 IN | WEIGHT: 152 LBS | HEART RATE: 62 BPM | BODY MASS INDEX: 27.62 KG/M2 | SYSTOLIC BLOOD PRESSURE: 142 MMHG | DIASTOLIC BLOOD PRESSURE: 83 MMHG

## 2023-03-03 DIAGNOSIS — E11.9 CONTROLLED TYPE 2 DIABETES MELLITUS WITHOUT COMPLICATION, WITHOUT LONG-TERM CURRENT USE OF INSULIN (HCC): ICD-10-CM

## 2023-03-03 DIAGNOSIS — Z12.11 SCREENING FOR COLON CANCER: ICD-10-CM

## 2023-03-03 DIAGNOSIS — Z12.5 SCREENING FOR PROSTATE CANCER: ICD-10-CM

## 2023-03-03 DIAGNOSIS — Z00.00 WELL ADULT EXAM: Primary | ICD-10-CM

## 2023-03-03 DIAGNOSIS — Z00.00 WELL ADULT EXAM: ICD-10-CM

## 2023-03-03 DIAGNOSIS — Z95.1 HX OF CABG: ICD-10-CM

## 2023-03-03 DIAGNOSIS — H91.93 BILATERAL HEARING LOSS, UNSPECIFIED HEARING LOSS TYPE: ICD-10-CM

## 2023-03-03 DIAGNOSIS — K21.9 GASTROESOPHAGEAL REFLUX DISEASE, UNSPECIFIED WHETHER ESOPHAGITIS PRESENT: ICD-10-CM

## 2023-03-03 LAB
ALBUMIN SERPL-MCNC: 4.1 G/DL (ref 3.4–5)
ALBUMIN/GLOB SERPL: 1.2 {RATIO} (ref 1–2)
ALP LIVER SERPL-CCNC: 93 U/L
ALT SERPL-CCNC: 34 U/L
ANION GAP SERPL CALC-SCNC: 6 MMOL/L (ref 0–18)
AST SERPL-CCNC: 18 U/L (ref 15–37)
BILIRUB SERPL-MCNC: 0.5 MG/DL (ref 0.1–2)
BUN BLD-MCNC: 25 MG/DL (ref 7–18)
BUN/CREAT SERPL: 25.5 (ref 10–20)
CALCIUM BLD-MCNC: 9.6 MG/DL (ref 8.5–10.1)
CHLORIDE SERPL-SCNC: 108 MMOL/L (ref 98–112)
CHOLEST SERPL-MCNC: 132 MG/DL (ref ?–200)
CO2 SERPL-SCNC: 28 MMOL/L (ref 21–32)
COMPLEXED PSA SERPL-MCNC: 1.68 NG/ML (ref ?–4)
CREAT BLD-MCNC: 0.98 MG/DL
CREAT UR-SCNC: 130 MG/DL
DEPRECATED RDW RBC AUTO: 42 FL (ref 35.1–46.3)
ERYTHROCYTE [DISTWIDTH] IN BLOOD BY AUTOMATED COUNT: 12.4 % (ref 11–15)
EST. AVERAGE GLUCOSE BLD GHB EST-MCNC: 134 MG/DL (ref 68–126)
FASTING PATIENT LIPID ANSWER: YES
FASTING STATUS PATIENT QL REPORTED: YES
GFR SERPLBLD BASED ON 1.73 SQ M-ARVRAT: 85 ML/MIN/1.73M2 (ref 60–?)
GLOBULIN PLAS-MCNC: 3.3 G/DL (ref 2.8–4.4)
GLUCOSE BLD-MCNC: 125 MG/DL (ref 70–99)
HBA1C MFR BLD: 6.3 % (ref ?–5.7)
HCT VFR BLD AUTO: 40.1 %
HDLC SERPL-MCNC: 36 MG/DL (ref 40–59)
HGB BLD-MCNC: 12.9 G/DL
LDLC SERPL CALC-MCNC: 79 MG/DL (ref ?–100)
MCH RBC QN AUTO: 29.5 PG (ref 26–34)
MCHC RBC AUTO-ENTMCNC: 32.2 G/DL (ref 31–37)
MCV RBC AUTO: 91.8 FL
MICROALBUMIN UR-MCNC: 0.8 MG/DL
MICROALBUMIN/CREAT 24H UR-RTO: 6.2 UG/MG (ref ?–30)
NONHDLC SERPL-MCNC: 96 MG/DL (ref ?–130)
OSMOLALITY SERPL CALC.SUM OF ELEC: 300 MOSM/KG (ref 275–295)
PLATELET # BLD AUTO: 155 10(3)UL (ref 150–450)
POTASSIUM SERPL-SCNC: 4.6 MMOL/L (ref 3.5–5.1)
PROT SERPL-MCNC: 7.4 G/DL (ref 6.4–8.2)
RBC # BLD AUTO: 4.37 X10(6)UL
SODIUM SERPL-SCNC: 142 MMOL/L (ref 136–145)
TRIGL SERPL-MCNC: 91 MG/DL (ref 30–149)
TSI SER-ACNC: 0.77 MIU/ML (ref 0.36–3.74)
VIT B12 SERPL-MCNC: 435 PG/ML (ref 193–986)
VLDLC SERPL CALC-MCNC: 14 MG/DL (ref 0–30)
WBC # BLD AUTO: 6.5 X10(3) UL (ref 4–11)

## 2023-03-03 PROCEDURE — 80053 COMPREHEN METABOLIC PANEL: CPT

## 2023-03-03 PROCEDURE — G0439 PPPS, SUBSEQ VISIT: HCPCS | Performed by: NURSE PRACTITIONER

## 2023-03-03 PROCEDURE — 84443 ASSAY THYROID STIM HORMONE: CPT

## 2023-03-03 PROCEDURE — 82570 ASSAY OF URINE CREATININE: CPT

## 2023-03-03 PROCEDURE — 36415 COLL VENOUS BLD VENIPUNCTURE: CPT

## 2023-03-03 PROCEDURE — 82043 UR ALBUMIN QUANTITATIVE: CPT

## 2023-03-03 PROCEDURE — 99213 OFFICE O/P EST LOW 20 MIN: CPT | Performed by: NURSE PRACTITIONER

## 2023-03-03 PROCEDURE — 83036 HEMOGLOBIN GLYCOSYLATED A1C: CPT

## 2023-03-03 PROCEDURE — 85027 COMPLETE CBC AUTOMATED: CPT

## 2023-03-03 PROCEDURE — 82607 VITAMIN B-12: CPT

## 2023-03-03 PROCEDURE — 80061 LIPID PANEL: CPT

## 2023-03-03 RX ORDER — ATORVASTATIN CALCIUM 80 MG/1
80 TABLET, FILM COATED ORAL DAILY
Qty: 90 TABLET | Refills: 3 | Status: SHIPPED | OUTPATIENT
Start: 2023-03-03

## 2023-03-03 RX ORDER — METOPROLOL SUCCINATE 50 MG/1
50 TABLET, EXTENDED RELEASE ORAL DAILY
Qty: 90 TABLET | Refills: 3 | Status: SHIPPED | OUTPATIENT
Start: 2023-03-03

## 2023-03-03 RX ORDER — OMEPRAZOLE 20 MG/1
20 CAPSULE, DELAYED RELEASE ORAL DAILY
Qty: 90 CAPSULE | Refills: 3 | Status: SHIPPED | OUTPATIENT
Start: 2023-03-03

## 2023-03-03 RX ORDER — RAMIPRIL 1.25 MG/1
1.25 CAPSULE ORAL DAILY
Qty: 90 CAPSULE | Refills: 3 | Status: SHIPPED | OUTPATIENT
Start: 2023-03-03

## 2023-03-03 RX ORDER — OMEPRAZOLE 20 MG/1
20 CAPSULE, DELAYED RELEASE ORAL DAILY
COMMUNITY
Start: 2023-02-07 | End: 2023-03-03

## 2023-03-03 NOTE — ASSESSMENT & PLAN NOTE
Refill ppi  Discussed low acid, low spice diet  Please call if symptoms worsen or are not resolving.

## 2023-03-03 NOTE — ASSESSMENT & PLAN NOTE
Screening labs  Referral for colonoscopy as diverticulosis was found in 2015  psa ordered  Information for advanced care planning given to pt

## 2023-03-03 NOTE — TELEPHONE ENCOUNTER
From: VINNIE Diaz  To: Nicole Martinez  Sent: 3/3/2023 9:33 AM CST  Subject: hearing loss    Mian-your dad was to follow up with an audiologist due to his hearing loss. I don't think he ever did so I am placing an order for him. Please let me know if you have any questions or concerns.    Please call [phone] for appointment  CM Diaz, JASPAL-C

## 2023-03-03 NOTE — ASSESSMENT & PLAN NOTE
Was seen by Dr Shahab Melissa in [facility] to follow up w audiology  Was seen by audiology and hearing aids were recommended-they are very expensive and chose not to get them

## 2023-05-04 ENCOUNTER — TELEPHONE (OUTPATIENT)
Dept: FAMILY MEDICINE CLINIC | Facility: CLINIC | Age: 68
End: 2023-05-04

## 2023-05-04 NOTE — TELEPHONE ENCOUNTER
Bridget from [facility]'s office is requesting a call back regarding patient's diabetic shoes. It is noted an APN cannot sign off on this request and requesting an MD name and signature. Please advise.       Phone # [phone]

## 2023-05-04 NOTE — TELEPHONE ENCOUNTER
Spoke to [facility] form Dr. Rosie Mccormack Podiatry's office. Bridget was informed Dr. Hector Victoria is Aleta's collaborating doctor. Order will be faxed over for Diabetic shoes. Bridget verbalized understanding.

## 2023-05-10 NOTE — TELEPHONE ENCOUNTER
Refill passed per [facility], [facility] protocol. External record/Pt reported    Requested Prescriptions   Pending Prescriptions Disp Refills    ASPIRIN 81 OR  0     Sig: Take by mouth daily.        Aspirin Protocol Passed - 5/9/2023  4:54 PM        Passed - In person appointment or virtual visit in the past 6 mos or appointment in next 3 mos     Recent Outpatient Visits              2 months ago Well adult exam    [address] [facility], VINNIE Cole    Office Visit    6 months ago     [facility] in [place] [address] [address] Visit    6 months ago     [facility] in [address]    6 months ago     [facility] in [place], [place]    Office Visit    6 months ago     [facility] Nova in [place] [address] De[address] Visit          Future Appointments         Provider Department Appt Notes    In 1 month GI [address], [address], [address]                    Recent Outpatient Visits              2 months ago Well adult exam    [address] [facility], VINNIE Cole    Office Visit    6 months ago     [facility] Nova in [facility], [address] [place] [address]    6 months ago     NELY Nova in [address]    6 months ago     [facility] in [place], [place]    Office Visit    6 months ago     [facility] in [place] [address] De[address]van Moshe Visit            Future Appointments         Provider Department Appt Notes    In 1 month [address], [address], [address]

## 2023-05-11 ENCOUNTER — MEDICAL CORRESPONDENCE (OUTPATIENT)
Dept: FAMILY MEDICINE CLINIC | Facility: CLINIC | Age: 68
End: 2023-05-11

## 2023-05-11 RX ORDER — ASPIRIN 81 MG/1
81 TABLET ORAL DAILY
Qty: 90 TABLET | Refills: 0 | Status: SHIPPED | OUTPATIENT
Start: 2023-05-11

## 2023-05-12 ENCOUNTER — MED REC SCAN ONLY (OUTPATIENT)
Dept: FAMILY MEDICINE CLINIC | Facility: CLINIC | Age: 68
End: 2023-05-12

## 2023-05-12 NOTE — TELEPHONE ENCOUNTER
Form was signed and faxed over to [facility] SONGPMARGO Leo at [phone]. Fax confirmation was received.

## 2023-06-13 ENCOUNTER — NURSE ONLY (OUTPATIENT)
Facility: CLINIC | Age: 68
End: 2023-06-13

## 2023-06-13 DIAGNOSIS — Z12.11 COLON CANCER SCREENING: Primary | ICD-10-CM

## 2023-07-20 RX ORDER — SODIUM, POTASSIUM,MAG SULFATES 17.5-3.13G
SOLUTION, RECONSTITUTED, ORAL ORAL
Qty: 1 EACH | Refills: 0 | Status: SHIPPED | OUTPATIENT
Start: 2023-07-20

## 2023-07-20 NOTE — PROGRESS NOTES
Dr Afshan Dominguez-    Please advise on orders for patient's Colonoscopy. Please see TE below. Thank you!

## 2023-07-20 NOTE — PROGRESS NOTES
Dx: average risk crc screening, had colonoscopy 5 years ago. Colonoscopy with MAC sedation  Split dose suprep, if suprep not covered then golytely or moviprep, sent to pharmacy  Please review prep instructions with patient    **SUTAB SENT    - If the patient is taking oral diabetic medications then they should HOLD diabetic medications the night before and/or the day of the procedure - If the patient is on insulin, please have the PCP or endocrinologist assist with management of dosing prior to the procedure.  - NO herbal supplements or weight loss medications x 7 days prior to the procedure. - If patient is taking Xarelto OR Eliquis then it needs to be helf for 48 hours prior to the procedure --> Should get approval from the prescribing physician (PCP or cardiologist) to hold this medication prior to the procedure. - If the patient is taking Coumadin then it needs to be on hold Coumadin for 5 days prior to the procedure --> Should get approval from the prescribing physician (PCP or cardiologist) to hold this medication prior to the procedure. *If the bowel prep prescribed is too expensive/not covered by the patient's insurance please pend an alternative and I will sign that order. Thank you.

## 2023-07-21 ENCOUNTER — PATIENT MESSAGE (OUTPATIENT)
Dept: GASTROENTEROLOGY | Facility: CLINIC | Age: 68
End: 2023-07-21

## 2023-07-21 RX ORDER — SOD SULF/POT CHLORIDE/MAG SULF 1.479 G
24 TABLET ORAL AS DIRECTED
Qty: 24 TABLET | Refills: 0 | Status: SHIPPED | OUTPATIENT
Start: 2023-07-21 | End: 2023-07-22

## 2023-07-21 NOTE — PROGRESS NOTES
Scheduled for:  Colonoscopy-screen [address]    Provider Name:  Dr. Cheryl Turcios  Date:  Thursday, 11/9/2023  Location:  [facility]  Sedation:  MAC  Time:  12:30 PM Patient made aware [facility] will call the day before with an arrival time. Prep:  Split dose ? (PATIENT IS REQUESTING SUTAB)   Meds/Allergies Reconciled?:  Physician reviewed   Diagnosis with codes:  Colorectal cancer screening Z12.11  Was patient informed to call insurance with codes (Y/N):  I confirmed Medicare insurance with this patient. Referral sent?:  Referral was sent. [facility] or [address] notified?: Electronic case request was sent to [email or web address] via [employer]. Medication Orders:  HOLD diabetic medications the night before and/or the day of the procedure. DO NOT TAKE: Iron pills, herbal supplements, multi-vitamins, or diet medications (i.e. Phentermine/Vyvanse) for 7 days before exam.  Misc Orders:  Patient was informed about the new cancellation policy for his/her procedure. Patient was also given a copy of the cancellation policy at the time of the appointment and verbalized understanding. Further instructions given by staff:  Once prep type is determined I will then send instructions via [employer].

## 2023-07-21 NOTE — PROGRESS NOTES
Hi Dr. Juve Joyce,   A message was sent to me but it is blank- please advise on message before.  Thank you

## 2023-07-21 NOTE — PROGRESS NOTES
Terry Lackey,    Patient is requesting SUTAB as his desired prep. Please advise, if appropriate.      Thank you

## 2023-08-16 NOTE — TELEPHONE ENCOUNTER
From: Vy Stoner  Sent: 8/15/2023 2:58 PM CDT  To: [facility] Clinical Staff  Subject: Split Dose SUTAB Prep Tablets for Colonoscopy    Hello,    Was this operation canceled? It is no longer in my [facility] calendar.

## 2023-10-01 DIAGNOSIS — Z95.1 HX OF CABG: ICD-10-CM

## 2023-10-02 NOTE — TELEPHONE ENCOUNTER
Please review; protocol failed. No active /future labs noted   Message sent for patient to make an appointment. Requested Prescriptions   Pending Prescriptions Disp Refills    RAMIPRIL 1.25 MG Oral Cap [Pharmacy Med Name: RAMIPRIL 1.25 MG CAPSULE] 90 capsule 3     Sig: TAKE 1 CAPSULE BY MOUTH DAILY. Hypertensive Medications Protocol Failed - 10/1/2023  7:04 AM        Failed - Last BP reading less than 140/90     BP Readings from Last 1 Encounters:  03/03/23 : 142/83              Failed - CMP or BMP in past 6 months     No results found for this or any previous visit (from the past 4392 hour(s)).             Failed - In person appointment or virtual visit in the past 6 months     Recent Outpatient Visits              3 months ago Colon cancer screening    [address], [address], [place]    Nurse Only    7 months ago Well adult exam    [address] [facility], VINNIE Campo    Office Visit    10 months ago     [facility] in [address] Visit    10 months ago     [facility] in [address]    10 months ago     [facility] in [place], [place]    Office Visit          Future Appointments         Provider Department Appt Notes    In 1 month [address] [facility], [address], [address], [place] Little @ [facility] - In person appointment in the past 12 or next 3 months     Recent Outpatient Visits              3 months ago Colon cancer screening    [address], [address], [place]    Nurse Only    7 months ago Well adult exam    [address], [address], VINNIE Campo    Office Visit    10 months ago     [facility] in [facility], [address] [place] [address]    10 months ago     [facility] in [facility] Kaleb Costello [facility]    Office Visit    10 months ago     [facility] in [place], [place]    Office Visit          Future Appointments         Provider Department Appt Notes    In 1 month [facility], [address], [place] [facility] @ [facility] or GFRNAA > 50     GFR Evaluation  EGFRCR: 85 , resulted on 3/3/2023             Recent Outpatient Visits              3 months ago Colon cancer screening    Agnieszka Andrade, [address], [place]    Nurse Only    7 months ago Well adult exam    [address] [facility], Eduardo Meraz Patient, APRN    Office Visit    10 months ago     [facility] in [facility], [address] [place] [address] [place] Visit    10 months ago     [facility] in [facility] Kaleb Costello[address]    10 months ago     [facility] in [place], [place]    Office Visit          Future Appointments         Provider Department Appt Notes    In 1 month [address] [facility] [address], [address], [address], [place] Little @ [employer]

## 2023-10-03 RX ORDER — RAMIPRIL 1.25 MG/1
1.25 CAPSULE ORAL DAILY
Qty: 90 CAPSULE | Refills: 0 | Status: SHIPPED | OUTPATIENT
Start: 2023-10-03

## 2023-10-16 ENCOUNTER — TELEPHONE (OUTPATIENT)
Facility: CLINIC | Age: 68
End: 2023-10-16

## 2023-10-19 ENCOUNTER — LAB ENCOUNTER (OUTPATIENT)
Dept: LAB | Age: 68
End: 2023-10-19
Attending: INTERNAL MEDICINE
Payer: MEDICARE

## 2023-10-19 DIAGNOSIS — I25.10 CORONARY ATHEROSCLEROSIS OF NATIVE CORONARY ARTERY: Primary | ICD-10-CM

## 2023-10-19 DIAGNOSIS — E78.5 HYPERLIPEMIA: ICD-10-CM

## 2023-10-19 LAB
ALT SERPL-CCNC: 33 U/L
AST SERPL-CCNC: 14 U/L (ref 15–37)
CHOLEST SERPL-MCNC: 119 MG/DL (ref ?–200)
FASTING PATIENT LIPID ANSWER: YES
HDLC SERPL-MCNC: 44 MG/DL (ref 40–59)
LDLC SERPL CALC-MCNC: 48 MG/DL (ref ?–100)
NONHDLC SERPL-MCNC: 75 MG/DL (ref ?–130)
TRIGL SERPL-MCNC: 161 MG/DL (ref 30–149)
VLDLC SERPL CALC-MCNC: 23 MG/DL (ref 0–30)

## 2023-10-19 PROCEDURE — 80061 LIPID PANEL: CPT

## 2023-10-19 PROCEDURE — 36415 COLL VENOUS BLD VENIPUNCTURE: CPT

## 2023-10-19 PROCEDURE — 84460 ALANINE AMINO (ALT) (SGPT): CPT

## 2023-10-19 PROCEDURE — 84450 TRANSFERASE (AST) (SGOT): CPT

## 2023-11-08 ENCOUNTER — TELEPHONE (OUTPATIENT)
Facility: CLINIC | Age: 68
End: 2023-11-08

## 2023-11-08 NOTE — TELEPHONE ENCOUNTER
Pt's son wanted to know his time for tomorrow's procedure at [address]    I called and spoke to [address] at [address]    She told me pt has already been notified and RN spoke to him. Time is 10:45 for 12:00 pm procedure. I spoke to pt's son and gave him the time.  He states the RN probably spoke to his father    Pt's son voices understanding of correct time and states his father has his bowel prep instructions

## 2023-11-09 PROBLEM — Z12.11 SPECIAL SCREENING FOR MALIGNANT NEOPLASMS, COLON: Status: ACTIVE | Noted: 2023-03-03

## 2023-11-09 PROCEDURE — 88305 TISSUE EXAM BY PATHOLOGIST: CPT | Performed by: STUDENT IN AN ORGANIZED HEALTH CARE EDUCATION/TRAINING PROGRAM

## 2023-11-10 ENCOUNTER — TELEPHONE (OUTPATIENT)
Facility: CLINIC | Age: 68
End: 2023-11-10

## 2023-11-10 NOTE — TELEPHONE ENCOUNTER
----- Message from Francois Dunham MD sent at 11/10/2023  2:50 PM CST -----  2 adenomatous polyps removed on recent colonoscopy -- should have 5 year follow up. Mychart sent to patient. GI Staff:    Can you please place recall for this patient to have a colonoscopy in 5 years. Thank you.     Francois Dunham MD

## 2023-11-10 NOTE — TELEPHONE ENCOUNTER
Recall colon in 5 years per Dr Bud Pappas.  Colon done 11/09/2023    Health maintenance updated and message sent to pt outreach to repeat colonoscopy in 5 years

## 2023-12-08 ENCOUNTER — OFFICE VISIT (OUTPATIENT)
Dept: FAMILY MEDICINE CLINIC | Facility: CLINIC | Age: 68
End: 2023-12-08
Payer: MEDICARE

## 2023-12-08 VITALS
WEIGHT: 151 LBS | BODY MASS INDEX: 27.44 KG/M2 | TEMPERATURE: 98 F | HEIGHT: 62.4 IN | HEART RATE: 70 BPM | DIASTOLIC BLOOD PRESSURE: 79 MMHG | SYSTOLIC BLOOD PRESSURE: 121 MMHG

## 2023-12-08 DIAGNOSIS — J06.9 VIRAL UPPER RESPIRATORY TRACT INFECTION: Primary | ICD-10-CM

## 2023-12-08 DIAGNOSIS — E11.9 CONTROLLED TYPE 2 DIABETES MELLITUS WITHOUT COMPLICATION, WITHOUT LONG-TERM CURRENT USE OF INSULIN (HCC): ICD-10-CM

## 2023-12-08 DIAGNOSIS — R05.1 ACUTE COUGH: ICD-10-CM

## 2023-12-08 PROCEDURE — 99214 OFFICE O/P EST MOD 30 MIN: CPT | Performed by: NURSE PRACTITIONER

## 2023-12-08 RX ORDER — ALBUTEROL SULFATE 90 UG/1
2 AEROSOL, METERED RESPIRATORY (INHALATION) EVERY 4 HOURS PRN
Qty: 18 G | Refills: 1 | Status: SHIPPED | OUTPATIENT
Start: 2023-12-08

## 2023-12-08 RX ORDER — CODEINE PHOSPHATE/GUAIFENESIN 10-100MG/5
LIQUID (ML) ORAL
Qty: 180 ML | Refills: 0 | Status: SHIPPED | OUTPATIENT
Start: 2023-12-08

## 2023-12-08 RX ORDER — FLUTICASONE PROPIONATE AND SALMETEROL XINAFOATE 230; 21 UG/1; UG/1
2 AEROSOL, METERED RESPIRATORY (INHALATION) 2 TIMES DAILY
Qty: 1 EACH | Refills: 0 | Status: SHIPPED | OUTPATIENT
Start: 2023-12-08

## 2023-12-08 NOTE — ASSESSMENT & PLAN NOTE
Check rsv, influenza, covid swab  Albuterol hfa 2 puffs qid as needed w spacer  Advair 231/21 mcg 2 puffs twice a day w spacer-rinse mouth after wards  Cheratussin ac 1-2 tsp  qid as needed-use w caution due to potential for unsteadiness, drowsiness  Please call if symptoms worsen or are not resolving. Discussed w pt red flag symptoms that if they occur, pt should immediately go to ER. Pt states understanding.

## 2023-12-09 LAB
FLUAV + FLUBV RNA SPEC NAA+PROBE: NOT DETECTED
FLUAV + FLUBV RNA SPEC NAA+PROBE: NOT DETECTED
RSV RNA SPEC NAA+PROBE: NOT DETECTED
SARS-COV-2 RNA RESP QL NAA+PROBE: NOT DETECTED

## 2023-12-10 ENCOUNTER — PATIENT MESSAGE (OUTPATIENT)
Dept: FAMILY MEDICINE CLINIC | Facility: CLINIC | Age: 68
End: 2023-12-10

## 2023-12-13 ENCOUNTER — MED REC SCAN ONLY (OUTPATIENT)
Facility: CLINIC | Age: 68
End: 2023-12-13

## 2023-12-27 DIAGNOSIS — Z95.1 HX OF CABG: ICD-10-CM

## 2023-12-28 NOTE — TELEPHONE ENCOUNTER
Please review; protocol failed/ has no protocol    No active /future labs noted     Requested Prescriptions   Pending Prescriptions Disp Refills    RAMIPRIL 1.25 MG Oral Cap [Pharmacy Med Name: RAMIPRIL 1.25 MG CAPSULE] 90 capsule 0     Sig: Take 1 capsule (1.25 mg total) by mouth daily. Needs physical  and labs-no refills       Hypertensive Medications Protocol Failed - 12/27/2023  8:30 AM        Failed - CMP or BMP in past 6 months     No results found for this or any previous visit (from the past 4392 hour(s)).             Passed - In person appointment in the past 12 or next 3 months     Recent Outpatient Visits              2 weeks ago Viral upper respiratory tract infection    [facility] VINNIE Dawn    Office Visit    6 months ago Colon cancer screening    [address], [address], [place]    Nurse Only    10 months ago Well adult exam    [address] [facility], VINNIE Dawn    Office Visit    1 year ago     [facility] in [facility], [facility] [place]    Office Visit    1 year ago     [facility] in [facility], [address] [place] [address] - Last BP reading less than 140/90     BP Readings from Last 1 Encounters:   12/08/23 121/79               Passed - In person appointment or virtual visit in the past 6 months     Recent Outpatient Visits              2 weeks ago Viral upper respiratory tract infection    [address], [facility], VINNIE Dawn    Office Visit    6 months ago Colon cancer screening    [address], [address], [place]    Nurse Only    10 months ago Well adult exam    [address] [facility], VINNIE Dawn    Office Visit    1 year ago     [facility] in [facility], [address] [place]    Office Visit    1 year ago [facility] in [place], [place]    Office Visit                      Passed - [facility] or GFRNAA > 50     GFR Evaluation  EGFRCR: 85 , resulted on 3/3/2023             Recent Outpatient Visits              2 weeks ago Viral upper respiratory tract infection    [facility], [address], Eduardo Seo, VINNIE    Office Visit    6 months ago Colon cancer screening    [address], [address], [place]    Nurse Only    10 months ago Well adult exam    [address] [facility], Eduardo Seo, APRN    Office Visit    1 year ago     [facility] in [facility], [address] Visit    1 year ago     [facility] in [facility], [address] Visit

## 2023-12-29 RX ORDER — RAMIPRIL 1.25 MG/1
1.25 CAPSULE ORAL DAILY
Qty: 30 CAPSULE | Refills: 0 | Status: SHIPPED | OUTPATIENT
Start: 2023-12-29

## 2024-01-06 DIAGNOSIS — Z95.1 HX OF CABG: ICD-10-CM

## 2024-01-08 NOTE — TELEPHONE ENCOUNTER
Please review; protocol failed/ has no protocol    Requested Prescriptions   Pending Prescriptions Disp Refills    METOPROLOL SUCCINATE ER 50 MG Oral Tablet 24 Hr [Pharmacy Med Name: METOPROLOL SUCC ER 50 MG TAB] 90 tablet 3     Sig: TAKE 1 TABLET BY MOUTH EVERY DAY       Hypertensive Medications Protocol Failed - 1/6/2024 10:59 AM        Failed - CMP or BMP in past 6 months     No results found for this or any previous visit (from the past 4392 hour(s)).            Passed - In person appointment in the past 12 or next 3 months     Recent Outpatient Visits              1 month ago Viral upper respiratory tract infection    [facility], Aleta Medeiros APRN    Office Visit    6 months ago Colon cancer screening    [facility], [place]    Nurse Only    10 months ago Well adult exam    [facility], Aleta Medeiros APRN    Office Visit    1 year ago     [facility] in [place], Sade, PT    Office Visit    1 year ago     [facility] in [place], Gisha, OT    Office Visit                      Passed - Last BP reading less than 140/90     BP Readings from Last 1 Encounters:   12/08/23 121/79               Passed - In person appointment or virtual visit in the past 6 months     Recent Outpatient Visits              1 month ago Viral upper respiratory tract infection    [facility], Aleta Medeiros APRN    Office Visit    6 months ago Colon cancer screening    [facility], [place]    Nurse Only    10 months ago Well adult exam    [facility], Aleta Medeiros APRN    Office Visit    1 year ago     [facility] in [place], Sade, PT    Office Visit    1 year ago     [facility] in [place], Gisha, OT     Office Visit                      Passed - EGFRCR or GFRNAA > 50     GFR Evaluation  EGFRCR: 85 , resulted on 3/3/2023            RAMIPRIL 1.25 MG Oral Cap [Pharmacy Med Name: RAMIPRIL 1.25 MG CAPSULE] 30 capsule 0     Sig: Take 1 capsule (1.25 mg total) by mouth daily. Needs physical  and labs-no refills       Hypertensive Medications Protocol Failed - 1/6/2024 10:59 AM        Failed - CMP or BMP in past 6 months     No results found for this or any previous visit (from the past 4392 hour(s)).            Passed - In person appointment in the past 12 or next 3 months     Recent Outpatient Visits              1 month ago Viral upper respiratory tract infection    [facility], Aleta Medeiros APRN    Office Visit    6 months ago Colon cancer screening    [facility], [place]    Nurse Only    10 months ago Well adult exam    [facility], Aleta Medeiros APRN    Office Visit    1 year ago     [facility] in [place], PT    Office Visit    1 year ago     [facility] in [place] Donald[place]mani, OT    Office Visit                      Passed - Last BP reading less than 140/90     BP Readings from Last 1 Encounters:   12/08/23 121/79               Passed - In person appointment or virtual visit in the past 6 months     Recent Outpatient Visits              1 month ago Viral upper respiratory tract infection    [facility], Aleta Medeiros APRN    Office Visit    6 months ago Colon cancer screening    [facility], [place]    Nurse Only    10 months ago Well adult exam    [facility] Aleta Medeiros APRN    Office Visit    1 year ago     [facility] in [place], PT    Office Visit    1 year ago     [facility] in [place] I[place] Bennett, OT    Office Visit                      Passed - EGFRCR or GFRNAA > 50     GFR Evaluation  EGFRCR: 85 , resulted on 3/3/2023             Recent Outpatient Visits              1 month ago Viral upper respiratory tract infection    [facility], [facility], Aleta Medeiros APRN    Office Visit    6 months ago Colon cancer screening    [facility], [facility] [facility]    Nurse Only    10 months ago Well adult exam    [facility], Aleta Medeiros APRN    Office Visit    1 year ago     [facility] in [place] Sade Linares, PT    Office Visit    1 year ago     [facility] in [place] DonaldImani, OT    Office Visit

## 2024-01-09 RX ORDER — RAMIPRIL 1.25 MG/1
1.25 CAPSULE ORAL DAILY
Qty: 30 CAPSULE | Refills: 0 | Status: SHIPPED | OUTPATIENT
Start: 2024-01-09

## 2024-01-09 RX ORDER — METOPROLOL SUCCINATE 50 MG/1
50 TABLET, EXTENDED RELEASE ORAL DAILY
Qty: 90 TABLET | Refills: 0 | Status: SHIPPED | OUTPATIENT
Start: 2024-01-09

## 2024-01-30 DIAGNOSIS — Z95.1 HX OF CABG: ICD-10-CM

## 2024-01-30 NOTE — TELEPHONE ENCOUNTER
90 Day refill request    Current Outpatient Medications   Medication Sig Dispense Refill           ramipril 1.25 MG Oral Cap Take 1 capsule (1.25 mg total) by mouth daily. Needs physical  and labs-no refills 30 capsule 0

## 2024-01-30 NOTE — TELEPHONE ENCOUNTER
Rx pended  After chart review annual is not due til March 2024.  Msg sent via [employer] to call and make annual appointment.

## 2024-01-31 NOTE — TELEPHONE ENCOUNTER
Please review.  Protocol failed / No protocol.    Requested Prescriptions   Pending Prescriptions Disp Refills    ramipril 1.25 MG Oral Cap 30 capsule 1     Sig: Take 1 capsule (1.25 mg total) by mouth daily.       Hypertensive Medications Protocol Failed - 1/30/2024 10:19 AM        Failed - CMP or BMP in past 6 months     No results found for this or any previous visit (from the past 4392 hour(s)).            Passed - In person appointment in the past 12 or next 3 months     Recent Outpatient Visits              1 month ago Viral upper respiratory tract infection    [facility], Aleta Medeiros APRN    Office Visit    7 months ago Colon cancer screening    [facility], [place]    Nurse Only    11 months ago Well adult exam    [facility], Aleta Medeiros APRN    Office Visit    1 year ago     [facility] in [place], PT    Office Visit    1 year ago     [facility] in [place], Gisha, OT    Office Visit                      Passed - Last BP reading less than 140/90     BP Readings from Last 1 Encounters:   12/08/23 121/79               Passed - In person appointment or virtual visit in the past 6 months     Recent Outpatient Visits              1 month ago Viral upper respiratory tract infection    [facility], Aleta Medeiros APRN    Office Visit    7 months ago Colon cancer screening    [facility], [place]    Nurse Only    11 months ago Well adult exam    [facility], Aleta Medeiros APRN    Office Visit    1 year ago     [facility] in [place], PT    Office Visit    1 year ago     [facility] in [place] [facility], OT    Office Visit                      Passed - EGFRCR or GFRNAA >  50     GFR Evaluation  EGFRCR: 85 , resulted on 3/3/2023                Recent Outpatient Visits              1 month ago Viral upper respiratory tract infection    [facility], [facility], Aleta Medeiros APRN    Office Visit    7 months ago Colon cancer screening    [facility], [place]    Nurse Only    11 months ago Well adult exam    [facility], [facility], Aleta Medeiros APRN    Office Visit    1 year ago     [facility] in [place] Sade Linares, PT    Office Visit    1 year ago     [facility] in [place] Imani Bennett, OT    Office Visit

## 2024-02-02 RX ORDER — RAMIPRIL 1.25 MG/1
1.25 CAPSULE ORAL DAILY
Qty: 30 CAPSULE | Refills: 1 | Status: SHIPPED | OUTPATIENT
Start: 2024-02-02

## 2024-02-14 DIAGNOSIS — E11.9 CONTROLLED TYPE 2 DIABETES MELLITUS WITHOUT COMPLICATION, WITHOUT LONG-TERM CURRENT USE OF INSULIN (HCC): ICD-10-CM

## 2024-02-15 NOTE — TELEPHONE ENCOUNTER
Please review; Protocol Failed/ no protocol.  Rx Pended, authorize if appropriate    Requested Prescriptions   Pending Prescriptions Disp Refills    metFORMIN 500 MG Oral Tab [Pharmacy Med Name: METFORMIN  MG TABLET] 180 tablet 3     Sig: TAKE 1 TABLET BY MOUTH TWICE A DAY       Diabetes Medication Protocol Failed - 2/14/2024  1:41 AM        Failed - Last A1C < 7.5 and within past 6 months     Lab Results   Component Value Date    A1C 6.3 (H) 03/03/2023             Passed - In person appointment or virtual visit in the past 6 mos or appointment in next 3 mos     Recent Outpatient Visits              2 months ago Viral upper respiratory tract infection    [facility] Aleta Wong APRN    Office Visit    8 months ago Colon cancer screening    [facility] [place]    Nurse Only    11 months ago Well adult exam    [facility] Aleta Wong APRN    Office Visit    1 year ago     [facility] [place] Sade Linares, PT    Office Visit    1 year ago     [facility] [place] Imani Bennett, OT    Office Visit          Future Appointments         Provider Department Appt Notes    In 2 weeks Aleta Wong APRN [facility], [address], [place] Medicare physical  last px 3/3/2023               Passed - Microalbumin procedure in past 12 months or taking ACE/ARB        Passed - EGFRCR or GFRNAA > 50     GFR Evaluation  EGFRCR: 85 , resulted on 3/3/2023          Passed - GFR in the past 12 months

## 2024-02-29 DIAGNOSIS — Z95.1 HX OF CABG: ICD-10-CM

## 2024-02-29 NOTE — TELEPHONE ENCOUNTER
Current Outpatient Medications:     ramipril 1.25 MG Oral Cap, Take 1 capsule (1.25 mg total) by mouth daily., Disp: 30 capsule, Rfl: 1

## 2024-03-01 RX ORDER — RAMIPRIL 1.25 MG/1
1.25 CAPSULE ORAL DAILY
Qty: 90 CAPSULE | Refills: 3 | Status: SHIPPED | OUTPATIENT
Start: 2024-03-01

## 2024-03-01 NOTE — TELEPHONE ENCOUNTER
Refill passed per [facility] protocol.  Requested Prescriptions   Pending Prescriptions Disp Refills    ramipril 1.25 MG Oral Cap 30 capsule 1     Sig: Take 1 capsule (1.25 mg total) by mouth daily.       Hypertension Medications Protocol Passed - 2/29/2024  3:14 PM        Passed - CMP or BMP in past 12 months        Passed - Last BP reading less than 140/90     BP Readings from Last 1 Encounters:   12/08/23 121/79               Passed - In person appointment or virtual visit in the past 12 mos or appointment in next 3 mos     Recent Outpatient Visits              2 months ago Viral upper respiratory tract infection    [facility] Aleta Wong APRN    Office Visit    8 months ago Colon cancer screening    [facility] [place]    Nurse Only    12 months [facility]    [facility]    1 year ago     [facility] [place] Chaida[place]MarcySade, PT    Office Visit    1 year ago     [facility] [place] DonaldImani, OT    Office Visit          Future Appointments         Provider Department Appt Notes    In 4 days Aleta Wong APRN [facility], [address], [place] Medicare physical  last px 3/3/2023               Passed - EGFRCR or GFRNAA > 50     GFR Evaluation  EGFRCR: 85 , resulted on 3/3/2023             Recent Outpatient Visits              2 months ago Viral upper respiratory tract infection    [facility] Aleta Wong APRN    Office Visit    8 months ago Colon cancer screening    [facility] [place] [place]    Nurse Only    12 months ago Well adult exam    [facility] Aleta Wong APRN    Office Visit    1 year ago     [facility] [place] Chaidais[place]MarcySade,  PT    Office Visit    1 year ago     [facility] [place] Abraham, Gisha, OT    Office Visit          Future Appointments         Provider Department Appt Notes    In 4 days Aleta Wong APRN [facility], [address], [place] Medicare physical  last px 3/3/2023

## 2024-03-05 ENCOUNTER — LAB ENCOUNTER (OUTPATIENT)
Dept: LAB | Age: 69
End: 2024-03-05
Attending: NURSE PRACTITIONER
Payer: MEDICARE

## 2024-03-05 ENCOUNTER — OFFICE VISIT (OUTPATIENT)
Dept: FAMILY MEDICINE CLINIC | Facility: CLINIC | Age: 69
End: 2024-03-05
Payer: MEDICARE

## 2024-03-05 ENCOUNTER — PATIENT MESSAGE (OUTPATIENT)
Dept: FAMILY MEDICINE CLINIC | Facility: CLINIC | Age: 69
End: 2024-03-05

## 2024-03-05 VITALS
BODY MASS INDEX: 27.98 KG/M2 | WEIGHT: 154 LBS | SYSTOLIC BLOOD PRESSURE: 122 MMHG | HEIGHT: 62.4 IN | DIASTOLIC BLOOD PRESSURE: 76 MMHG | HEART RATE: 60 BPM

## 2024-03-05 DIAGNOSIS — E11.9 CONTROLLED TYPE 2 DIABETES MELLITUS WITHOUT COMPLICATION, WITHOUT LONG-TERM CURRENT USE OF INSULIN (HCC): ICD-10-CM

## 2024-03-05 DIAGNOSIS — Z00.00 WELL ADULT EXAM: ICD-10-CM

## 2024-03-05 DIAGNOSIS — E11.9 CONTROLLED TYPE 2 DIABETES MELLITUS WITHOUT COMPLICATION, WITHOUT LONG-TERM CURRENT USE OF INSULIN (HCC): Primary | ICD-10-CM

## 2024-03-05 DIAGNOSIS — K21.9 GASTROESOPHAGEAL REFLUX DISEASE, UNSPECIFIED WHETHER ESOPHAGITIS PRESENT: ICD-10-CM

## 2024-03-05 DIAGNOSIS — R41.0 ACUTE CONFUSION: ICD-10-CM

## 2024-03-05 DIAGNOSIS — Z12.5 PROSTATE CANCER SCREENING: ICD-10-CM

## 2024-03-05 DIAGNOSIS — H91.93 BILATERAL HEARING LOSS, UNSPECIFIED HEARING LOSS TYPE: ICD-10-CM

## 2024-03-05 DIAGNOSIS — Z12.5 SCREENING FOR PROSTATE CANCER: ICD-10-CM

## 2024-03-05 DIAGNOSIS — Z95.1 HX OF CABG: ICD-10-CM

## 2024-03-05 PROBLEM — J06.9 VIRAL UPPER RESPIRATORY TRACT INFECTION: Status: RESOLVED | Noted: 2023-12-08 | Resolved: 2024-03-05

## 2024-03-05 PROBLEM — R05.1 ACUTE COUGH: Status: RESOLVED | Noted: 2023-12-08 | Resolved: 2024-03-05

## 2024-03-05 LAB
ALBUMIN SERPL-MCNC: 5.1 G/DL (ref 3.2–4.8)
ALBUMIN/GLOB SERPL: 1.6 {RATIO} (ref 1–2)
ALP LIVER SERPL-CCNC: 71 U/L
ALT SERPL-CCNC: 26 U/L
ANION GAP SERPL CALC-SCNC: 7 MMOL/L (ref 0–18)
AST SERPL-CCNC: 20 U/L (ref ?–34)
BILIRUB SERPL-MCNC: 0.5 MG/DL (ref 0.2–1.1)
BUN BLD-MCNC: 21 MG/DL (ref 9–23)
BUN/CREAT SERPL: 19.4 (ref 10–20)
CALCIUM BLD-MCNC: 10.6 MG/DL (ref 8.7–10.4)
CHLORIDE SERPL-SCNC: 105 MMOL/L (ref 98–112)
CO2 SERPL-SCNC: 28 MMOL/L (ref 21–32)
COMPLEXED PSA SERPL-MCNC: 2.06 NG/ML (ref ?–4)
CREAT BLD-MCNC: 1.08 MG/DL
CREAT UR-SCNC: 24.8 MG/DL
DEPRECATED RDW RBC AUTO: 41.1 FL (ref 35.1–46.3)
EGFRCR SERPLBLD CKD-EPI 2021: 75 ML/MIN/1.73M2 (ref 60–?)
ERYTHROCYTE [DISTWIDTH] IN BLOOD BY AUTOMATED COUNT: 12.4 % (ref 11–15)
EST. AVERAGE GLUCOSE BLD GHB EST-MCNC: 134 MG/DL (ref 68–126)
FASTING STATUS PATIENT QL REPORTED: YES
GLOBULIN PLAS-MCNC: 3.1 G/DL (ref 2.8–4.4)
GLUCOSE BLD-MCNC: 123 MG/DL (ref 70–99)
HBA1C MFR BLD: 6.3 % (ref ?–5.7)
HCT VFR BLD AUTO: 42.1 %
HGB BLD-MCNC: 13.9 G/DL
MCH RBC QN AUTO: 29.5 PG (ref 26–34)
MCHC RBC AUTO-ENTMCNC: 33 G/DL (ref 31–37)
MCV RBC AUTO: 89.4 FL
MICROALBUMIN UR-MCNC: 0.3 MG/DL
MICROALBUMIN/CREAT 24H UR-RTO: 12.1 UG/MG (ref ?–30)
OSMOLALITY SERPL CALC.SUM OF ELEC: 294 MOSM/KG (ref 275–295)
PLATELET # BLD AUTO: 182 10(3)UL (ref 150–450)
POTASSIUM SERPL-SCNC: 4.5 MMOL/L (ref 3.5–5.1)
PROT SERPL-MCNC: 8.2 G/DL (ref 5.7–8.2)
RBC # BLD AUTO: 4.71 X10(6)UL
SODIUM SERPL-SCNC: 140 MMOL/L (ref 136–145)
T4 FREE SERPL-MCNC: 1.4 NG/DL (ref 0.8–1.7)
TSI SER-ACNC: 17.25 MIU/ML (ref 0.55–4.78)
VIT B12 SERPL-MCNC: 881 PG/ML (ref 211–911)
WBC # BLD AUTO: 6.2 X10(3) UL (ref 4–11)

## 2024-03-05 PROCEDURE — 85027 COMPLETE CBC AUTOMATED: CPT

## 2024-03-05 PROCEDURE — 82570 ASSAY OF URINE CREATININE: CPT

## 2024-03-05 PROCEDURE — 36415 COLL VENOUS BLD VENIPUNCTURE: CPT

## 2024-03-05 PROCEDURE — 80053 COMPREHEN METABOLIC PANEL: CPT

## 2024-03-05 PROCEDURE — 84443 ASSAY THYROID STIM HORMONE: CPT

## 2024-03-05 PROCEDURE — 84439 ASSAY OF FREE THYROXINE: CPT

## 2024-03-05 PROCEDURE — 83036 HEMOGLOBIN GLYCOSYLATED A1C: CPT

## 2024-03-05 PROCEDURE — 82607 VITAMIN B-12: CPT

## 2024-03-05 PROCEDURE — 82043 UR ALBUMIN QUANTITATIVE: CPT

## 2024-03-05 RX ORDER — ROSUVASTATIN CALCIUM 40 MG/1
40 TABLET, COATED ORAL EVERY EVENING
COMMUNITY
Start: 2023-12-11

## 2024-03-05 NOTE — ASSESSMENT & PLAN NOTE
Screening labs  Please aim to eat a diet high in fresh fruits and vegetables, lean protein sources, complex carbohydrates and limited processed and fast foods.  Try to get at least 150 minutes of exercise per week-a combination of weight resistance and cardio is preferred.    Declines vaccines today  Psa ordered  Up to date colon ca screening

## 2024-03-05 NOTE — ASSESSMENT & PLAN NOTE
Continue present management  Diabetic labs  Diabetic foot exam complete-sees podiatry monthly for nail trim  Has dm eye exam in July

## 2024-03-05 NOTE — ASSESSMENT & PLAN NOTE
Watch diet for acidic, spicy and fried foods  Omeprazole 20 mg daily as needed  Please call if symptoms worsen or are not resolving.

## 2024-03-05 NOTE — PROGRESS NOTES
Subjective:   Mian Gomez is a 68 year old male who presents for a Medicare Subsequent Annual Wellness visit (Pt already had Initial Annual Wellness) and scheduled follow up of multiple significant but stable problems.     Checks bs in am -runs low 100s  yest 100 day prior 102  Had episode of confusion that resolved on its own this am. Has never had issue like this in the past.     Has appointment for eye exam in July  Sees podiatry monthly for nail trim.     Declines vaccines today  History/Other:   Fall Risk Assessment:   He has been screened for Falls and is low risk.      Cognitive Assessment:   Abnormal  What day of the week is this?: Incorrect (Sunday)  What month is it?: Correct  What year is it?: Correct  Recall \"Ball\": Correct  Recall \"Flag\": Correct  Recall \"Tree\": Incorrect    Functional Ability/Status:   Mian Gomez has a completely normal functional assessment. See flowsheet for details.      Depression Screening (PHQ-2/PHQ-9): PHQ-2 SCORE: 0  , done 2/27/2024            Advanced Directives:   He does have a Living Will but we do NOT have it on file in Epic.    He does NOT have a Power of  for Health Care. [Do you have a healthcare power of ?: No]  Discussed Advance Care Planning with patient (and family/surrogate if present). Standard forms made available to patient in After Visit Summary.      Patient Active Problem List   Diagnosis    Well adult exam    Screening for prostate cancer    Controlled type 2 diabetes mellitus without complication, without long-term current use of insulin (HCC)    Bilateral hearing loss    Hx of CABG    Special screening for malignant neoplasms, colon    Gastroesophageal reflux disease    Acute confusion     Allergies:  He has No Known Allergies.    Current Medications:  Outpatient Medications Marked as Taking for the 3/5/24 encounter (Office Visit) with Aleta Wong APRN   Medication Sig    rosuvastatin 40 MG Oral Tab Take 1 tablet (40 mg  total) by mouth every evening.    ramipril 1.25 MG Oral Cap Take 1 capsule (1.25 mg total) by mouth daily.    metFORMIN 500 MG Oral Tab Take 1 tablet (500 mg total) by mouth 2 (two) times daily. Pt is due for physical  and labs    metoprolol succinate ER 50 MG Oral Tablet 24 Hr Take 1 tablet (50 mg total) by mouth daily. Pt due for physical  and labs 3/3    fluticasone-salmeterol 230-21 MCG/ACT Inhalation Aerosol Inhale 2 puffs into the lungs 2 (two) times daily.    aspirin (ASPIRIN 81) 81 MG Oral Tab EC Take 1 tablet (81 mg total) by mouth daily.    omeprazole 20 MG Oral Capsule Delayed Release Take 1 capsule (20 mg total) by mouth daily.       Medical History:  He  has a past medical history of Coronary atherosclerosis, Diabetes (HCC), Essential hypertension, Heart disease, High blood pressure, High cholesterol, and Hyperlipidemia.  Surgical History:  He  has a past surgical history that includes cabg (2012) and colonoscopy (N/A, 11/9/2023).   Family History:  His family history is not on file.  Social History:  He  reports that he has never smoked. He has never used smokeless tobacco. He reports that he does not currently use alcohol. He reports that he does not currently use drugs.    Tobacco:  He has never smoked tobacco.    CAGE Alcohol Screen:   CAGE screening score of 0 on 2/27/2024, showing low risk of alcohol abuse.      No care team member to display    Review of Systems   Constitutional:  Negative for activity change, appetite change and fever.   HENT:  Positive for facial swelling. Negative for congestion, ear pain, rhinorrhea, sore throat and trouble swallowing.    Eyes:  Negative for pain, redness and visual disturbance.   Respiratory:  Negative for cough, chest tightness, shortness of breath and wheezing.    Cardiovascular:  Negative for chest pain and palpitations.   Gastrointestinal:  Negative for abdominal distention, abdominal pain, constipation, diarrhea, nausea and vomiting.   Endocrine:  Negative for polydipsia and polyuria.   Genitourinary:  Negative for difficulty urinating, dysuria and frequency.   Musculoskeletal:  Negative for back pain, gait problem and myalgias.   Skin:  Negative for color change and rash.   Neurological:  Negative for dizziness, weakness and headaches.   Psychiatric/Behavioral:  Negative for behavioral problems, decreased concentration, dysphoric mood and sleep disturbance. The patient is not nervous/anxious.         Episode of confusion this am-has fully resolved. Lasted short period of time.          Objective:   Physical Exam  Vitals and nursing note reviewed.   Constitutional:       General: He is not in acute distress.     Appearance: Normal appearance. He is well-developed.   HENT:      Head: Normocephalic and atraumatic.      Right Ear: Tympanic membrane, ear canal and external ear normal.      Left Ear: Tympanic membrane, ear canal and external ear normal.      Nose: Nose normal. No congestion or rhinorrhea.      Mouth/Throat:      Mouth: Mucous membranes are moist.      Pharynx: Oropharynx is clear. No oropharyngeal exudate or posterior oropharyngeal erythema.   Eyes:      General:         Right eye: No discharge.         Left eye: No discharge.      Conjunctiva/sclera: Conjunctivae normal.      Pupils: Pupils are equal, round, and reactive to light.   Neck:      Thyroid: No thyromegaly.   Cardiovascular:      Rate and Rhythm: Normal rate and regular rhythm.      Heart sounds: Normal heart sounds. No murmur heard.  Pulmonary:      Effort: Pulmonary effort is normal. No respiratory distress.      Breath sounds: Normal breath sounds. No wheezing or rales.   Chest:      Chest wall: No tenderness.   Abdominal:      General: Bowel sounds are normal. There is no distension.      Palpations: Abdomen is soft.      Tenderness: There is no abdominal tenderness.   Musculoskeletal:         General: No tenderness. Normal range of motion.      Cervical back: Normal range of  motion and neck supple.      Comments: Bilateral barefoot skin diabetic exam is normal, visualized feet and the appearance is normal.  Bilateral monofilament/sensation of both feet is normal.  Pulsation pedal pulse exam of both lower legs/feet is normal as well.        Lymphadenopathy:      Cervical: No cervical adenopathy.   Skin:     General: Skin is warm and dry.      Findings: No rash.   Neurological:      Mental Status: He is alert and oriented to person, place, and time.      Coordination: Coordination normal.      Gait: Gait normal.   Psychiatric:         Behavior: Behavior normal.         Thought Content: Thought content normal.         Judgment: Judgment normal.          /76   Pulse 60   Ht 5' 2.4\" (1.585 m)   Wt 154 lb (69.9 kg)   BMI 27.81 kg/m²  Estimated body mass index is 27.81 kg/m² as calculated from the following:    Height as of this encounter: 5' 2.4\" (1.585 m).    Weight as of this encounter: 154 lb (69.9 kg).    Medicare Hearing Assessment:   Hearing Screening    Time taken: 3/5/2024  9:12 AM  Screening Method: Finger Rub  Finger Rub Result: Pass               Visual Acuity:   Right Eye Visual Acuity: Uncorrected Right Eye Chart Acuity: 20/30   Left Eye Visual Acuity: Uncorrected Left Eye Chart Acuity: 20/30   Both Eyes Visual Acuity: Uncorrected Both Eyes Chart Acuity: 20/30   Able To Tolerate Visual Acuity: Yes        Assessment & Plan:   Mian Gomez is a 68 year old male who presents for a Medicare Assessment.     1. Controlled type 2 diabetes mellitus without complication, without long-term current use of insulin ([facility]) (Primary)  Assessment & Plan:  Continue present management  Diabetic labs  Diabetic foot exam complete-sees podiatry monthly for nail trim  Has dm eye exam in July  Orders:  -     Hemoglobin A1C; Future; Expected date: 03/05/2024  -     Microalb/Creat Ratio, Random Urine; Future; Expected date: 03/05/2024  2. Gastroesophageal reflux disease, unspecified whether  esophagitis present  Assessment & Plan:  Watch diet for acidic, spicy and fried foods  Omeprazole 20 mg daily as needed  Please call if symptoms worsen or are not resolving.    3. Hx of CABG  Assessment & Plan:  Continue present management      4. Screening for prostate cancer  Assessment & Plan:  Psa screening  Denies nocturia  5. Well adult exam  Assessment & Plan:  Screening labs  Please aim to eat a diet high in fresh fruits and vegetables, lean protein sources, complex carbohydrates and limited processed and fast foods.  Try to get at least 150 minutes of exercise per week-a combination of weight resistance and cardio is preferred.    Declines vaccines today  Psa ordered  Up to date colon ca screening  Orders:  -     CBC, Platelet; No Differential; Future; Expected date: 03/05/2024  -     Comp Metabolic Panel (14); Future; Expected date: 03/05/2024  -     Hemoglobin A1C; Future; Expected date: 03/05/2024  -     PSA Total, Screen; Future; Expected date: 03/05/2024  -     TSH W Reflex To Free T4; Future; Expected date: 03/05/2024  -     Vitamin B12; Future; Expected date: 03/05/2024  6. Acute confusion  Assessment & Plan:  Will monitor for continue symptoms-please call if this occurs  Symptoms fully resolved in office  7. Prostate cancer screening  -     PSA Total, Screen; Future; Expected date: 03/05/2024  8. Bilateral hearing loss, unspecified hearing loss type  Assessment & Plan:  Referral for audiology placed  Orders:  -     OP REFERRAL TO AUDIOLOGY    The patient indicates understanding of these issues and agrees to the plan.  Consult ordered.  Continue with current treatment plan.  Lab work ordered.  Patient to call if symptoms worsen or no improvement in  4 week(s).  Reinforced healthy diet, lifestyle, and exercise.      No follow-ups on file.     Aleta Wong, APRN, 3/5/2024     Supplementary Documentation:   General Health:  In the past six months, have you lost more than 10 pounds without trying?:  2 - No  Has your appetite been poor?: No  Type of Diet: Balanced  How does the patient maintain a good energy level?: Daily Walks  How would you describe your daily physical activity?: Light  How would you describe your current health state?: Fair  How do you maintain positive mental well-being?: Social Interaction;Puzzles;Games  On a scale of 0 to 10, with 0 being no pain and 10 being severe pain, what is your pain level?: 0 - (None)  In the past six months, have you experienced urine leakage?: 0-No  At any time do you feel concerned for the safety/well-being of yourself and/or your children, in your home or elsewhere?: No  Have you had any immunizations at another office such as Influenza, Hepatitis B, Tetanus, or Pneumococcal?: No        Mian Gomez's SCREENING SCHEDULE   Tests on this list are recommended by your physician but may not be covered, or covered at this frequency, by your insurer.   Please check with your insurance carrier before scheduling to verify coverage.   PREVENTATIVE SERVICES FREQUENCY &  COVERAGE DETAILS LAST COMPLETION DATE   Diabetes Screening    Fasting Blood Sugar / Glucose    One screening every 12 months if never tested or if previously tested but not diagnosed with pre-diabetes   One screening every 6 months if diagnosed with pre-diabetes Lab Results   Component Value Date     (H) 03/03/2023        Cardiovascular Disease Screening    Lipid Panel  Cholesterol  Lipoprotein (HDL)  Triglycerides Covered every 5 years for all Medicare beneficiaries without apparent signs or symptoms of cardiovascular disease Lab Results   Component Value Date    CHOLEST 119 10/19/2023    HDL 44 10/19/2023    LDL 48 10/19/2023    TRIG 161 (H) 10/19/2023         Electrocardiogram (EKG)   Covered if needed at Welcome to Medicare, and non-screening if indicated for medical reasons -      Ultrasound Screening for Abdominal Aortic Aneurysm (AAA) Covered once in a lifetime for one of the following  risk factors    Men who are 65-75 years old and have ever smoked    Anyone with a family history -     Colorectal Cancer Screening  Covered for ages 50-85; only need ONE of the following:    Colonoscopy   Covered every 10 years    Covered every 2 years if patient is at high risk or previous colonoscopy was abnormal 11/09/2023    Health Maintenance   Topic Date Due    Colorectal Cancer Screening  11/09/2028       Flexible Sigmoidoscopy   Covered every 4 years -    Fecal Occult Blood Test Covered annually -   Prostate Cancer Screening    Prostate-Specific Antigen (PSA) Annually No results found for: \"PSA\"  Health Maintenance   Topic Date Due    PSA  03/03/2025      Immunizations    Influenza Covered once per flu season  Please get every year -  Influenza Vaccine(1) due on 10/01/2023    Pneumococcal Each vaccine ([id number] & [id number]) covered once after 65 Prevnar 13: 02/20/2015    Syfrftles21: 11/18/2020     No recommendations at this time    Hepatitis B One screening covered for patients with certain risk factors   -  No recommendations at this time    Tetanus Toxoid Not covered by Medicare Part B unless medically necessary (cut with metal); may be covered with your pharmacy prescription benefits -    Tetanus, Diptheria and Pertusis TD and TDaP Not covered by Medicare Part B -  No recommendations at this time    Zoster Not covered by Medicare Part B; may be covered with your pharmacy  prescription benefits 01/01/2010  Zoster Vaccines(2 of 3) due on 02/26/2010     Diabetes      Hemoglobin A1C Annually; if last result is elevated, may be asked to retest more frequently.    Medicare covers every 3 months Lab Results   Component Value Date     (H) 03/03/2023    A1C 6.3 (H) 03/03/2023       Hemoglobin A1C Test due on 09/03/2023    Creat/alb ratio Annually Lab Results   Component Value Date    MICROALBCREA 6.2 03/03/2023       LDL Annually Lab Results   Component Value Date    LDL 48 10/19/2023       Dilated Eye  Exam Annually Last Diabetic Eye Exam:  No data recorded  No data recorded       Annual Monitoring of Persistent Medications (ACE/ARB, digoxin diuretics, anticonvulsants)    Potassium Annually Lab Results   Component Value Date    K 4.6 03/03/2023         Creatinine   Annually Lab Results   Component Value Date    CREATSERUM 0.98 03/03/2023         BUN Annually Lab Results   Component Value Date    BUN 25 (H) 03/03/2023       Drug Serum Conc Annually No results found for: \"DIGOXIN\", \"DIG\", \"VALP\"

## 2024-03-07 DIAGNOSIS — E03.9 HYPOTHYROIDISM, UNSPECIFIED TYPE: ICD-10-CM

## 2024-03-07 DIAGNOSIS — R79.89 ELEVATED TSH: Primary | ICD-10-CM

## 2024-03-07 NOTE — TELEPHONE ENCOUNTER
Aleta, please advise.     Component      Latest Ref Rng 3/5/2024   TSH      0.550 - 4.780 mIU/mL 17.251 (H)

## 2024-03-07 NOTE — TELEPHONE ENCOUNTER
From: Mian Gomez  To: Aleta Wong  Sent: 3/5/2024 2:28 PM CST  Subject: TSH Reflex Test    [place],     I was looking at the results for the labwork today and noticed the thyroid stimulating hormone with reflex was incredibly high! Is there a concern here or do you think there was an error in the lab of some sort?

## 2024-03-08 DIAGNOSIS — R79.89 ELEVATED TSH: Primary | ICD-10-CM

## 2024-03-25 ENCOUNTER — LAB ENCOUNTER (OUTPATIENT)
Dept: LAB | Age: 69
End: 2024-03-25
Attending: NURSE PRACTITIONER
Payer: MEDICARE

## 2024-03-25 DIAGNOSIS — E03.9 HYPOTHYROIDISM, UNSPECIFIED TYPE: Primary | ICD-10-CM

## 2024-03-25 DIAGNOSIS — R79.89 ELEVATED TSH: ICD-10-CM

## 2024-03-25 LAB
T4 FREE SERPL-MCNC: 1.3 NG/DL (ref 0.8–1.7)
TSI SER-ACNC: 15.92 MIU/ML (ref 0.55–4.78)

## 2024-03-25 PROCEDURE — 36415 COLL VENOUS BLD VENIPUNCTURE: CPT

## 2024-03-25 PROCEDURE — 84439 ASSAY OF FREE THYROXINE: CPT

## 2024-03-25 PROCEDURE — 84443 ASSAY THYROID STIM HORMONE: CPT

## 2024-03-25 RX ORDER — LEVOTHYROXINE SODIUM 0.05 MG/1
50 TABLET ORAL
Qty: 90 TABLET | Refills: 3 | Status: SHIPPED | OUTPATIENT
Start: 2024-03-25

## 2024-05-01 DIAGNOSIS — K21.9 GASTROESOPHAGEAL REFLUX DISEASE, UNSPECIFIED WHETHER ESOPHAGITIS PRESENT: ICD-10-CM

## 2024-05-02 RX ORDER — OMEPRAZOLE 20 MG/1
20 CAPSULE, DELAYED RELEASE ORAL DAILY
Qty: 90 CAPSULE | Refills: 3 | Status: SHIPPED | OUTPATIENT
Start: 2024-05-02

## 2024-05-02 NOTE — TELEPHONE ENCOUNTER
REFILL PASSED PER [facility] PROTOCOLS    Requested Prescriptions   Pending Prescriptions Disp Refills    OMEPRAZOLE 20 MG Oral Capsule Delayed Release [Pharmacy Med Name: OMEPRAZOLE DR 20 MG CAPSULE] 90 capsule 3     Sig: TAKE 1 CAPSULE BY MOUTH EVERY DAY       Gastrointestional Medication Protocol Passed - 5/1/2024 12:38 AM        Passed - In person appointment or virtual visit in the past 12 mos or appointment in next 3 mos     Recent Outpatient Visits              1 month ago Controlled type 2 diabetes mellitus without complication, without long-term current use of insulin ([facility]    Office Visit    4 months ago Viral upper respiratory tract infection    [facility]    Office Visit    10 months ago Colon cancer screening    [facility]    Nurse Only    1 year ago Well adult exam    [facility]    Office Visit    1 year ago     [facility] [place] Cha[place]ade, PT    Office Visit          Future Appointments         Provider Department Appt Notes    In 1 week Meryl Breen DPM [facility] Diabetic check up for toe nails and consult                         Future Appointments         Provider Department Appt Notes    In 1 week Meryl Breen DPM [facility] Diabetic check up for toe nails and consult          Recent Outpatient Visits              1 month ago Controlled type 2 diabetes mellitus without complication, without long-term current use of insulin ([facility])    [facility]    Office Visit    4 months ago Viral upper respiratory tract infection    [facility], Aleta Medeiros APRN    Office Visit    10 months ago Colon cancer screening    [facility] [place]    Nurse Only    1 year ago Well adult exam    [facility], [facility], Aleta Medeiros APRN    Office Visit    1 year ago     [facility] [place] Chaidais[place]Sade, PT    Office Visit

## 2024-05-10 ENCOUNTER — OFFICE VISIT (OUTPATIENT)
Dept: PODIATRY CLINIC | Facility: CLINIC | Age: 69
End: 2024-05-10
Payer: MEDICARE

## 2024-05-10 DIAGNOSIS — E11.9 TYPE 2 DIABETES MELLITUS WITHOUT COMPLICATION, WITHOUT LONG-TERM CURRENT USE OF INSULIN (HCC): Primary | ICD-10-CM

## 2024-05-10 DIAGNOSIS — L60.3 NAIL DYSTROPHY: ICD-10-CM

## 2024-05-10 PROCEDURE — 99203 OFFICE O/P NEW LOW 30 MIN: CPT | Performed by: STUDENT IN AN ORGANIZED HEALTH CARE EDUCATION/TRAINING PROGRAM

## 2024-05-10 NOTE — PROGRESS NOTES
[facility] Podiatry  Progress Note      Mian Gomez is a 68 year old male.   Chief Complaint   Patient presents with    Diabetic Foot Care     Consult - DFC - Pt here for foot check and nail trim. No pain or concerns. FBS-110 6.3 on 3/05             HPI:     patient is a 68-year-old diabetic male presents to clinic for bilateral foot evaluation.  He is accompanied by his wife.    Allergies: Patient has no known allergies.    Current Outpatient Medications   Medication Sig Dispense Refill    omeprazole 20 MG Oral Capsule Delayed Release Take 1 capsule (20 mg total) by mouth daily. 90 capsule 3    levothyroxine 50 MCG Oral Tab Take 1 tablet (50 mcg total) by mouth before breakfast. 90 tablet 3    rosuvastatin 40 MG Oral Tab Take 1 tablet (40 mg total) by mouth every evening.      ramipril 1.25 MG Oral Cap Take 1 capsule (1.25 mg total) by mouth daily. 90 capsule 3    metFORMIN 500 MG Oral Tab Take 1 tablet (500 mg total) by mouth 2 (two) times daily. Pt is due for physical  and labs 180 tablet 0    metoprolol succinate ER 50 MG Oral Tablet 24 Hr Take 1 tablet (50 mg total) by mouth daily. Pt due for physical  and labs 3/3 90 tablet 0    fluticasone-salmeterol 230-21 MCG/ACT Inhalation Aerosol Inhale 2 puffs into the lungs 2 (two) times daily. 1 each 0    aspirin (ASPIRIN 81) 81 MG Oral Tab EC Take 1 tablet (81 mg total) by mouth daily. 90 tablet 0      Past Medical History:    Coronary atherosclerosis    Diabetes (HCC)    Essential hypertension    Heart disease    High blood pressure    High cholesterol    Hyperlipidemia      Past Surgical History:   Procedure Laterality Date    Cabg  2012    Colonoscopy N/A 11/9/2023    Procedure: COLONOSCOPY;  Surgeon: Scott Garcia MD;  Location: [facility] MAIN OR      History reviewed. No pertinent family history.   Social History     Socioeconomic History    Marital status:    Tobacco Use    Smoking status: Never    Smokeless tobacco: Never   Vaping Use     Vaping status: Never Used   Substance and Sexual Activity    Alcohol use: Not Currently    Drug use: Not Currently           REVIEW OF SYSTEMS:     Denies nause, fever, chills  No calf pain  Denies chest pain or SOB      EXAM:   There were no vitals taken for this visit.  GENERAL: well developed, well nourished, in no apparent distress  EXTREMITIES:   1. Integument: Normal skin temperature and turgor.  Toenails x10 are elongated, thickened and discolored with subungal derbi.     2. Vascular: Dorsalis pedis two out of four bilateral and posterior tibial pulses two out of   four bilateral, capillary refill normal.   3. Musculoskeletal: All muscle groups are graded 5 out of 5 in the foot and ankle.   4. Neurological: Normal sharp dull sensation; reflexes normal.             ASSESSMENT AND PLAN:   Diagnoses and all orders for this visit:    Type 2 diabetes mellitus without complication, without long-term current use of insulin (HCC)  -     DME - External    Nail dystrophy        Plan:       -Patient examined, chart history reviewed.  -Discussed importance of proper pedal hygiene, regular foot checks, and tight glucose control.  -Sharply debrided nails x10 with a sterile nail nipper achieving a 20% reduction in thickness and length, without incident.   -Ambulate with supportive shoes and inserts and avoid walking barefoot.  -Educated patient on acute signs of infection advised patient to seek immediate medical attention if symptoms arise.    RTC in 3 months       The patient indicates understanding of these issues and agrees to the plan.        Meryl Breen DPM

## 2024-05-15 DIAGNOSIS — E11.9 CONTROLLED TYPE 2 DIABETES MELLITUS WITHOUT COMPLICATION, WITHOUT LONG-TERM CURRENT USE OF INSULIN (HCC): ICD-10-CM

## 2024-05-17 NOTE — TELEPHONE ENCOUNTER
REFILL PASSED PER [facility] PROTOCOLS    Requested Prescriptions   Pending Prescriptions Disp Refills    METFORMIN 500 MG Oral Tab [Pharmacy Med Name: METFORMIN  MG TABLET] 180 tablet 0     Sig: Take 1 tablet (500 mg total) by mouth 2 (two) times daily. Pt is due for physical  and labs       Diabetes Medication Protocol Passed - 5/15/2024 11:34 AM        Passed - Last A1C < 7.5 and within past 6 months     Lab Results   Component Value Date    A1C 6.3 (H) 03/05/2024             Passed - In person appointment or virtual visit in the past 6 mos or appointment in next 3 mos     Recent Outpatient Visits              1 week ago Type 2 diabetes mellitus without complication, without long-term current use of insulin ([facility])    [facility] Meryl Breen DPM    Office Visit    2 months ago Controlled type 2 diabetes mellitus without complication, without long-term current use of insulin ([facility])    [facility] [place]    Office Visit    5 months ago Viral upper respiratory tract infection    [facility] [place]    Office Visit    11 months ago Colon cancer screening    [facility]    Nurse Only    1 year ago Well adult exam    [facility] [place]    Office Visit          Future Appointments         Provider Department Appt Notes    In 2 months Meryl Breen DPM [facility] 3 month                    Passed - Microalbumin procedure in past 12 months or taking ACE/ARB        Passed - EGFRCR or GFRNAA > 50     GFR Evaluation  EGFRCR: 75 , resulted on 3/5/2024          Passed - GFR in the past 12 months             Future Appointments         Provider Department Appt Notes    In 2 months Meryl Breen  DPM [facility], [facility] 3 month          Recent Outpatient Visits              1 week ago Type 2 diabetes mellitus without complication, without long-term current use of insulin ([facility])    [facility] Meryl Breen, FITO    Office Visit    2 months ago Controlled type 2 diabetes mellitus without complication, without long-term current use of insulin ([facility])    [facility], [place]    Office Visit    5 months ago Viral upper respiratory tract infection    [facility], Aleta Medeiros APRN    Office Visit    11 months ago Colon cancer screening    [facility]    Nurse Only    1 year ago Well adult exam    [facility], Aleta Medeiros APRN    Office Visit

## 2024-06-21 DIAGNOSIS — Z95.1 HX OF CABG: ICD-10-CM

## 2024-06-23 RX ORDER — METOPROLOL SUCCINATE 50 MG/1
50 TABLET, EXTENDED RELEASE ORAL DAILY
Qty: 90 TABLET | Refills: 1 | Status: SHIPPED | OUTPATIENT
Start: 2024-06-23

## 2024-06-23 NOTE — TELEPHONE ENCOUNTER
Refill passed per [facility] protocol.     Requested Prescriptions   Pending Prescriptions Disp Refills    METOPROLOL SUCCINATE ER 50 MG Oral Tablet 24 Hr [Pharmacy Med Name: METOPROLOL SUCC ER 50 MG TAB] 90 tablet 0     Sig: TAKE 1 TABLET BY MOUTH EVERY DAY       Hypertension Medications Protocol Passed - 6/21/2024 12:07 AM        Passed - CMP or BMP in past 12 months        Passed - Last BP reading less than 140/90     BP Readings from Last 1 Encounters:   03/05/24 122/76               Passed - In person appointment or virtual visit in the past 12 mos or appointment in next 3 mos     Recent Outpatient Visits              1 month ago Type 2 diabetes mellitus without complication, without long-term current use of insulin ([facility])    [facility] [place] Meryl Lundberg DPM    Office Visit    3 months ago Controlled type 2 diabetes mellitus without complication, without long-term current use of insulin ([facility])    [facility] ([place], [place] Aleta Medeiros APRN    Office Visit    6 months ago Viral upper respiratory tract infection    [facility] ([place], [place] Aleta Medeiros APRN    Office Visit    1 year ago Colon cancer screening    [facility] [place]    Nurse Only    1 year ago Well adult exam    [facility] ([place], [place] Aleta Medeiros APRN    Office Visit                      Passed - EGFRCR or GFRNAA > 50     GFR Evaluation  EGFRCR: 75 , resulted on 3/5/2024                Recent Outpatient Visits              1 month ago Type 2 diabetes mellitus without complication, without long-term current use of insulin ([facility])    [facility], [place] Meryl Lundberg DPM    Office Visit    3 months ago Controlled type 2 diabetes mellitus without complication, without long-term current use of insulin ([facility])    [facility]  [facility], [facility], Aleta Medeiros APRN    Office Visit    6 months ago Viral upper respiratory tract infection    [facility], [facility], Aleta Medeiros APRN    Office Visit    1 year ago Colon cancer screening    [facility], [facility]    Nurse Only    1 year ago Well adult exam    [facility], [facility], Aleta Medeiros APRN    Office Visit

## 2024-09-03 ENCOUNTER — LAB ENCOUNTER (OUTPATIENT)
Dept: LAB | Age: 69
End: 2024-09-03
Attending: NURSE PRACTITIONER
Payer: MEDICARE

## 2024-09-03 DIAGNOSIS — E03.9 HYPOTHYROIDISM, UNSPECIFIED TYPE: ICD-10-CM

## 2024-09-03 LAB
T3 SERPL-MCNC: 0.84 NG/ML (ref 0.6–1.81)
T3FREE SERPL-MCNC: 2.87 PG/ML (ref 2.4–4.2)
T4 FREE SERPL-MCNC: 1.4 NG/DL (ref 0.8–1.7)
T4 SERPL-MCNC: 6.1 UG/DL
THYROGLOB SERPL-MCNC: 34 U/ML (ref ?–60)
THYROPEROXIDASE AB SERPL-ACNC: 30 U/ML (ref ?–60)
TSI SER-ACNC: 14.06 MIU/ML (ref 0.55–4.78)

## 2024-09-03 PROCEDURE — 84443 ASSAY THYROID STIM HORMONE: CPT

## 2024-09-03 PROCEDURE — 86800 THYROGLOBULIN ANTIBODY: CPT

## 2024-09-03 PROCEDURE — 36415 COLL VENOUS BLD VENIPUNCTURE: CPT

## 2024-09-03 PROCEDURE — 86376 MICROSOMAL ANTIBODY EACH: CPT

## 2024-09-03 PROCEDURE — 84439 ASSAY OF FREE THYROXINE: CPT

## 2024-09-03 PROCEDURE — 84481 FREE ASSAY (FT-3): CPT

## 2024-09-03 PROCEDURE — 84480 ASSAY TRIIODOTHYRONINE (T3): CPT

## 2024-09-04 DIAGNOSIS — E03.9 ACQUIRED HYPOTHYROIDISM: Primary | ICD-10-CM

## 2024-09-04 RX ORDER — LEVOTHYROXINE SODIUM 75 UG/1
75 TABLET ORAL
Qty: 90 TABLET | Refills: 1 | Status: SHIPPED | OUTPATIENT
Start: 2024-09-04

## 2025-01-08 ENCOUNTER — OFFICE VISIT (OUTPATIENT)
Dept: FAMILY MEDICINE CLINIC | Facility: CLINIC | Age: 70
End: 2025-01-08
Payer: MEDICARE

## 2025-01-08 ENCOUNTER — LAB ENCOUNTER (OUTPATIENT)
Dept: LAB | Age: 70
End: 2025-01-08
Attending: NURSE PRACTITIONER
Payer: MEDICARE

## 2025-01-08 VITALS
DIASTOLIC BLOOD PRESSURE: 61 MMHG | HEART RATE: 67 BPM | TEMPERATURE: 97 F | HEIGHT: 62.4 IN | SYSTOLIC BLOOD PRESSURE: 96 MMHG | BODY MASS INDEX: 28.38 KG/M2 | WEIGHT: 156.19 LBS

## 2025-01-08 DIAGNOSIS — R79.89 ELEVATED TSH: ICD-10-CM

## 2025-01-08 DIAGNOSIS — Z12.5 SCREENING FOR PROSTATE CANCER: ICD-10-CM

## 2025-01-08 DIAGNOSIS — E03.9 ACQUIRED HYPOTHYROIDISM: ICD-10-CM

## 2025-01-08 DIAGNOSIS — E11.9 CONTROLLED TYPE 2 DIABETES MELLITUS WITHOUT COMPLICATION, WITHOUT LONG-TERM CURRENT USE OF INSULIN (HCC): Primary | ICD-10-CM

## 2025-01-08 DIAGNOSIS — R26.9 GAIT DISTURBANCE: ICD-10-CM

## 2025-01-08 DIAGNOSIS — E11.9 CONTROLLED TYPE 2 DIABETES MELLITUS WITHOUT COMPLICATION, WITHOUT LONG-TERM CURRENT USE OF INSULIN (HCC): ICD-10-CM

## 2025-01-08 DIAGNOSIS — Z00.00 WELL ADULT EXAM: ICD-10-CM

## 2025-01-08 DIAGNOSIS — Z95.1 HX OF CABG: ICD-10-CM

## 2025-01-08 DIAGNOSIS — R35.0 URINARY FREQUENCY: ICD-10-CM

## 2025-01-08 DIAGNOSIS — R41.3 MEMORY LOSS: ICD-10-CM

## 2025-01-08 DIAGNOSIS — R25.1 TREMOR OF BOTH HANDS: ICD-10-CM

## 2025-01-08 DIAGNOSIS — K21.9 GASTROESOPHAGEAL REFLUX DISEASE, UNSPECIFIED WHETHER ESOPHAGITIS PRESENT: ICD-10-CM

## 2025-01-08 PROBLEM — I25.810 CORONARY ARTERY DISEASE INVOLVING CORONARY BYPASS GRAFT OF NATIVE HEART WITHOUT ANGINA PECTORIS: Status: ACTIVE | Noted: 2025-01-08

## 2025-01-08 PROBLEM — R41.0 ACUTE CONFUSION: Status: RESOLVED | Noted: 2024-03-05 | Resolved: 2025-01-08

## 2025-01-08 PROBLEM — E11.49 DIABETES MELLITUS TYPE 2 WITH NEUROLOGICAL MANIFESTATIONS (HCC): Chronic | Status: ACTIVE | Noted: 2025-01-08

## 2025-01-08 LAB
ALBUMIN SERPL-MCNC: 5.1 G/DL (ref 3.2–4.8)
ALBUMIN/GLOB SERPL: 2.1 {RATIO} (ref 1–2)
ALP LIVER SERPL-CCNC: 64 U/L
ALT SERPL-CCNC: 23 U/L
ANION GAP SERPL CALC-SCNC: 7 MMOL/L (ref 0–18)
AST SERPL-CCNC: 21 U/L (ref ?–34)
BILIRUB SERPL-MCNC: 0.6 MG/DL (ref 0.2–1.1)
BILIRUB UR QL: NEGATIVE
BUN BLD-MCNC: 14 MG/DL (ref 9–23)
BUN/CREAT SERPL: 13.1 (ref 10–20)
CALCIUM BLD-MCNC: 10.5 MG/DL (ref 8.7–10.4)
CHLORIDE SERPL-SCNC: 104 MMOL/L (ref 98–112)
CHOLEST SERPL-MCNC: 122 MG/DL (ref ?–200)
CLARITY UR: CLEAR
CO2 SERPL-SCNC: 29 MMOL/L (ref 21–32)
COLOR UR: COLORLESS
COMPLEXED PSA SERPL-MCNC: 2.02 NG/ML (ref ?–4)
CREAT BLD-MCNC: 1.07 MG/DL
CREAT UR-SCNC: 22 MG/DL
DEPRECATED RDW RBC AUTO: 41.9 FL (ref 35.1–46.3)
EGFRCR SERPLBLD CKD-EPI 2021: 76 ML/MIN/1.73M2 (ref 60–?)
ERYTHROCYTE [DISTWIDTH] IN BLOOD BY AUTOMATED COUNT: 12.7 % (ref 11–15)
EST. AVERAGE GLUCOSE BLD GHB EST-MCNC: 128 MG/DL (ref 68–126)
FASTING PATIENT LIPID ANSWER: YES
FASTING STATUS PATIENT QL REPORTED: YES
GLOBULIN PLAS-MCNC: 2.4 G/DL (ref 2–3.5)
GLUCOSE BLD-MCNC: 124 MG/DL (ref 70–99)
GLUCOSE UR-MCNC: NORMAL MG/DL
HBA1C MFR BLD: 6.1 % (ref ?–5.7)
HCT VFR BLD AUTO: 42.9 %
HDLC SERPL-MCNC: 35 MG/DL (ref 40–59)
HGB BLD-MCNC: 14.1 G/DL
HGB UR QL STRIP.AUTO: NEGATIVE
KETONES UR-MCNC: NEGATIVE MG/DL
LDLC SERPL CALC-MCNC: 65 MG/DL (ref ?–100)
LEUKOCYTE ESTERASE UR QL STRIP.AUTO: NEGATIVE
MCH RBC QN AUTO: 29.8 PG (ref 26–34)
MCHC RBC AUTO-ENTMCNC: 32.9 G/DL (ref 31–37)
MCV RBC AUTO: 90.7 FL
MICROALBUMIN UR-MCNC: 0.4 MG/DL
MICROALBUMIN/CREAT 24H UR-RTO: 18.2 UG/MG (ref ?–30)
NITRITE UR QL STRIP.AUTO: NEGATIVE
NONHDLC SERPL-MCNC: 87 MG/DL (ref ?–130)
OSMOLALITY SERPL CALC.SUM OF ELEC: 292 MOSM/KG (ref 275–295)
PH UR: 7 [PH] (ref 5–8)
PLATELET # BLD AUTO: 157 10(3)UL (ref 150–450)
POTASSIUM SERPL-SCNC: 4.6 MMOL/L (ref 3.5–5.1)
PROT SERPL-MCNC: 7.5 G/DL (ref 5.7–8.2)
PROT UR-MCNC: NEGATIVE MG/DL
RBC # BLD AUTO: 4.73 X10(6)UL
SODIUM SERPL-SCNC: 140 MMOL/L (ref 136–145)
SP GR UR STRIP: <1.005 (ref 1–1.03)
T4 FREE SERPL-MCNC: 1.7 NG/DL (ref 0.8–1.7)
TRIGL SERPL-MCNC: 124 MG/DL (ref 30–149)
TSI SER-ACNC: 10.6 UIU/ML (ref 0.55–4.78)
UROBILINOGEN UR STRIP-ACNC: NORMAL
VLDLC SERPL CALC-MCNC: 19 MG/DL (ref 0–30)
WBC # BLD AUTO: 6.7 X10(3) UL (ref 4–11)

## 2025-01-08 PROCEDURE — 85027 COMPLETE CBC AUTOMATED: CPT

## 2025-01-08 PROCEDURE — 84439 ASSAY OF FREE THYROXINE: CPT

## 2025-01-08 PROCEDURE — 80053 COMPREHEN METABOLIC PANEL: CPT

## 2025-01-08 PROCEDURE — 82043 UR ALBUMIN QUANTITATIVE: CPT

## 2025-01-08 PROCEDURE — 82570 ASSAY OF URINE CREATININE: CPT

## 2025-01-08 PROCEDURE — 83036 HEMOGLOBIN GLYCOSYLATED A1C: CPT

## 2025-01-08 PROCEDURE — 80061 LIPID PANEL: CPT

## 2025-01-08 PROCEDURE — 84443 ASSAY THYROID STIM HORMONE: CPT

## 2025-01-08 PROCEDURE — 81003 URINALYSIS AUTO W/O SCOPE: CPT | Performed by: NURSE PRACTITIONER

## 2025-01-08 PROCEDURE — 36415 COLL VENOUS BLD VENIPUNCTURE: CPT

## 2025-01-08 RX ORDER — LANCETS 33 GAUGE
1 EACH MISCELLANEOUS 2 TIMES DAILY
Qty: 200 EACH | Refills: 3 | Status: SHIPPED | OUTPATIENT
Start: 2025-01-08

## 2025-01-08 RX ORDER — LANCETS 30 GAUGE
EACH MISCELLANEOUS
Qty: 200 EACH | Refills: 3 | Status: CANCELLED | OUTPATIENT
Start: 2025-01-08

## 2025-01-08 RX ORDER — ROSUVASTATIN CALCIUM 40 MG/1
40 TABLET, COATED ORAL EVERY EVENING
Qty: 90 TABLET | Refills: 3 | Status: SHIPPED | OUTPATIENT
Start: 2025-01-08

## 2025-01-08 RX ORDER — GLUCOSAMINE HCL/CHONDROITIN SU 500-400 MG
1 CAPSULE ORAL 2 TIMES DAILY
Qty: 200 EACH | Refills: 4 | Status: SHIPPED | OUTPATIENT
Start: 2025-01-08

## 2025-01-08 RX ORDER — BLOOD-GLUCOSE METER
KIT MISCELLANEOUS
Qty: 200 STRIP | Refills: 3 | Status: CANCELLED | OUTPATIENT
Start: 2025-01-08

## 2025-01-08 RX ORDER — BLOOD-GLUCOSE CONTROL, NORMAL
1 EACH MISCELLANEOUS AS NEEDED
Qty: 1 EACH | Refills: 3 | Status: SHIPPED | OUTPATIENT
Start: 2025-01-08

## 2025-01-08 RX ORDER — BLOOD-GLUCOSE METER
1 KIT MISCELLANEOUS 2 TIMES DAILY
Qty: 1 KIT | Refills: 0 | Status: CANCELLED | OUTPATIENT
Start: 2025-01-08

## 2025-01-08 RX ORDER — METOPROLOL SUCCINATE 50 MG/1
50 TABLET, EXTENDED RELEASE ORAL DAILY
Qty: 90 TABLET | Refills: 3 | Status: SHIPPED | OUTPATIENT
Start: 2025-01-08

## 2025-01-08 RX ORDER — RAMIPRIL 1.25 MG/1
1.25 CAPSULE ORAL DAILY
Qty: 90 CAPSULE | Refills: 3 | Status: SHIPPED | OUTPATIENT
Start: 2025-01-08

## 2025-01-08 RX ORDER — BLOOD SUGAR DIAGNOSTIC
1 STRIP MISCELLANEOUS 2 TIMES DAILY
Qty: 200 STRIP | Refills: 3 | Status: SHIPPED | OUTPATIENT
Start: 2025-01-08

## 2025-01-08 RX ORDER — ASPIRIN 81 MG/1
81 TABLET ORAL DAILY
Qty: 90 TABLET | Refills: 3 | Status: SHIPPED | OUTPATIENT
Start: 2025-01-08

## 2025-01-08 RX ORDER — BLOOD-GLUCOSE METER
1 EACH MISCELLANEOUS 2 TIMES DAILY
Qty: 1 KIT | Refills: 0 | Status: SHIPPED | OUTPATIENT
Start: 2025-01-08

## 2025-01-08 RX ORDER — LEVOTHYROXINE SODIUM 75 UG/1
75 TABLET ORAL
Qty: 90 TABLET | Refills: 3 | Status: SHIPPED | OUTPATIENT
Start: 2025-01-08

## 2025-01-08 NOTE — TELEPHONE ENCOUNTER
Per \"other orders\" placed by VINNIE Wong 1/8/2025: will electronically send in the following approved supplies: Will send [employer] Ultra diabetic supplies, as Medicare covers this brand.

## 2025-01-08 NOTE — PROGRESS NOTES
Subjective:   Mian Gomez is a 69 year old male who presents for a Medicare Subsequent Annual Wellness visit (Pt already had Initial Annual Wellness) and scheduled follow up of multiple significant but stable problems.   Pt is concerned about bilat hand tremors when he goes to reach for something or  something in hands. He is having trouble opening things. Gait is a little slower and more shuffling.   Is a little more forgetful at times-short term memory issues according to wife.    C/o feet feeling numb at night.    Needs refills on diabetic testing supplies. Sees podiatry for DM foot care.   Has eye doctor appointment in July for annual DM eye exam-req copies of notes.     Sees [facility] for CAD    Has increased freq of urination during the day-has difficulty holding urine  Gets up 1-2 at night.     Language barrier at times-pt speaks mostly Croatian but wife is here to translate for him.   History/Other:   Fall Risk Assessment:   He has been screened for Falls and is High Risk. Fall Prevention information provided to patient in After Visit Summary.    Do you feel unsteady when standing or walking?: No  Do you worry about falling?: No  Have you fallen in the past year?: Yes  Were you injured?: Yes     Cognitive Assessment:   He had a completely normal cognitive assessment - see flowsheet entries     Functional Ability/Status:   Mian Gomez has some abnormal functions as listed below:  He has Hearing problems based on screening of functional status.He has Vision problems based on screening of functional status.       Depression Screening (PHQ):  PHQ-2 SCORE: 0  , done 1/8/2025   If you checked off any problems, how difficult have these problems made it for you to do your work, take care of things at home, or get along with other people?: Not difficult at all    Last [place] Suicide Screening on 1/8/2025 was No Risk.         Advanced Directives:   He does have a Living Will but we do NOT have it  on file in Epic.    He does NOT have a Power of  for Health Care. [Do you have a healthcare power of ?: (Patient-Rptd) No]  Discussed Advance Care Planning with patient (and family/surrogate if present). Standard forms made available to patient in After Visit Summary.      Patient Active Problem List   Diagnosis    Well adult exam    Screening for prostate cancer    Controlled type 2 diabetes mellitus without complication, without long-term current use of insulin (HCC)    Bilateral hearing loss    Hx of CABG    Special screening for malignant neoplasms, colon    Gastroesophageal reflux disease    Hypothyroidism    Tremor of both hands    Coronary artery disease involving coronary bypass graft of native heart without angina pectoris    Memory loss    Urinary frequency    Diabetes mellitus type 2 with neurological manifestations (HCC)     Allergies:  He has No Known Allergies.    Current Medications:  Outpatient Medications Marked as Taking for the 1/8/25 encounter (Office Visit) with Aleta Wong APRN   Medication Sig    aspirin (ASPIRIN 81) 81 MG Oral Tab EC Take 1 tablet (81 mg total) by mouth daily.    levothyroxine 75 MCG Oral Tab Take 1 tablet (75 mcg total) by mouth before breakfast.    metFORMIN 500 MG Oral Tab Take 1 tablet (500 mg total) by mouth 2 (two) times daily. Pt is due for labs    metoprolol succinate ER 50 MG Oral Tablet 24 Hr Take 1 tablet (50 mg total) by mouth daily.    omeprazole 20 MG Oral Capsule Delayed Release Take 1 capsule (20 mg total) by mouth daily.    ramipril 1.25 MG Oral Cap Take 1 capsule (1.25 mg total) by mouth daily.    rosuvastatin 40 MG Oral Tab Take 1 tablet (40 mg total) by mouth every evening.       Medical History:  He  has a past medical history of Acute confusion (03/05/2024), Coronary atherosclerosis, Diabetes (HCC), Essential hypertension, Heart disease, High blood pressure, High cholesterol, and Hyperlipidemia.  Surgical History:  He  has a past  surgical history that includes cabg (2012) and colonoscopy (N/A, 11/9/2023).   Family History:  His family history is not on file.  Social History:  He  reports that he has never smoked. He has never used smokeless tobacco. He reports that he does not currently use alcohol. He reports that he does not currently use drugs.    Tobacco:  He has never smoked tobacco.    CAGE Alcohol Screen:   CAGE screening score of 0 on 1/1/2025, showing low risk of alcohol abuse.      No care team member to display    Review of Systems   Constitutional:  Positive for activity change. Negative for appetite change, fatigue and fever.   HENT:  Negative for congestion, ear pain, rhinorrhea, sore throat and trouble swallowing.    Eyes:  Negative for pain, redness and visual disturbance.   Respiratory:  Negative for cough, chest tightness, shortness of breath and wheezing.    Cardiovascular:  Negative for chest pain and palpitations.   Gastrointestinal:  Negative for abdominal distention, abdominal pain, constipation, diarrhea, nausea and vomiting.   Genitourinary:  Positive for frequency and urgency. Negative for difficulty urinating and dysuria.   Musculoskeletal:  Positive for gait problem. Negative for back pain and myalgias.   Skin:  Negative for color change and rash.   Neurological:  Positive for tremors and weakness. Negative for dizziness and headaches.   Psychiatric/Behavioral:  Positive for confusion (short term memory issues.). Negative for dysphoric mood and sleep disturbance. The patient is not nervous/anxious.          Objective:   Physical Exam  Vitals and nursing note reviewed.   Constitutional:       General: He is not in acute distress.     Appearance: Normal appearance. He is well-developed.   HENT:      Head: Normocephalic and atraumatic.      Right Ear: Tympanic membrane, ear canal and external ear normal.      Left Ear: Tympanic membrane, ear canal and external ear normal.      Nose: Nose normal.      Mouth/Throat:       Mouth: Mucous membranes are moist.      Pharynx: Oropharynx is clear.   Eyes:      General:         Right eye: No discharge.         Left eye: No discharge.      Conjunctiva/sclera: Conjunctivae normal.      Pupils: Pupils are equal, round, and reactive to light.   Neck:      Thyroid: No thyromegaly.   Cardiovascular:      Rate and Rhythm: Normal rate and regular rhythm.      Heart sounds: Normal heart sounds. No murmur heard.  Pulmonary:      Effort: Pulmonary effort is normal. No respiratory distress.      Breath sounds: Normal breath sounds. No wheezing or rales.   Chest:      Chest wall: No tenderness.   Abdominal:      General: Bowel sounds are normal. There is no distension.      Palpations: Abdomen is soft.      Tenderness: There is no abdominal tenderness.   Musculoskeletal:         General: No tenderness. Normal range of motion.      Cervical back: Normal range of motion and neck supple.      Comments: Bilateral barefoot skin diabetic exam is abnormal with diabetic monofilament/sensation testing abnormal    Lymphadenopathy:      Cervical: No cervical adenopathy.   Skin:     General: Skin is warm and dry.      Findings: No rash.   Neurological:      Mental Status: He is alert and oriented to person, place, and time.      Coordination: Coordination normal.      Gait: Gait abnormal (gait is sl slowed and more shuffling).      Comments: Bilat hand tremors w purposeful movement.    Psychiatric:         Speech: Speech is delayed.         Behavior: Behavior is slowed.         BP 96/61 (BP Location: Right arm, Patient Position: Sitting, Cuff Size: adult)   Pulse 67   Temp 97 °F (36.1 °C)   Ht 5' 2.4\" (1.585 m)   Wt 156 lb 3.2 oz (70.9 kg)   BMI 28.20 kg/m²  Estimated body mass index is 28.2 kg/m² as calculated from the following:    Height as of this encounter: 5' 2.4\" (1.585 m).    Weight as of this encounter: 156 lb 3.2 oz (70.9 kg).    Medicare Hearing Assessment:   Hearing Screening    Screening Method:  Questionnaire  I have a problem hearing over the telephone: No I have trouble following the conversations when two or more people are talking at the same time: Yes   I have trouble understanding things on the TV: No I have to strain to understand conversations: No   I have to worry about missing the telephone ring or doorbell: No I have trouble hearing conversations in a noisy background such as a crowded room or restaurant: No   I get confused about where sounds come from: No    I especially have trouble understanding the speech of women and children: No I have trouble understanding the speaker in a large room such as at a meeting or place of Religious: No   Many people I talk to seem to mumble (or don't speak clearly): No People get annoyed because I misunderstand what they say: Yes   I misunderstand what others are saying and make inappropriate responses: Yes I avoid social activities because I cannot hear well and fear I will reply improperly: Yes   Family members and friends have told me they think I may have hearing loss: No                   Assessment & Plan:   Mian Gomez is a 69 year old male who presents for a Medicare Assessment.     1. Controlled type 2 diabetes mellitus without complication, without long-term current use of insulin (HCC) (Primary)  Assessment & Plan:  Continue present management  Refill diabetic testing supplies  Diabetic labs ordered.   Diabetic foot exam completed  Sees podiatry routinely  Has dm eye appointment in July-encourage to send records   Orders:  -     Microalb/Creat Ratio, Random Urine; Future; Expected date: 01/08/2025  -     metFORMIN HCl; Take 1 tablet (500 mg total) by mouth 2 (two) times daily. Pt is due for labs  Dispense: 180 tablet; Refill: 3  -     Diabetic Test Strips and Supplies  2. Elevated TSH  3. Acquired hypothyroidism  Assessment & Plan:  >>ASSESSMENT AND PLAN FOR ELEVATED TSH WRITTEN ON 1/13/2025 11:41 AM BY ALAN OSMAN APRN    Check tsh  levels  cpm    Orders:  -     TSH W Reflex To Free T4; Future; Expected date: 01/08/2025  -     Levothyroxine Sodium; Take 1 tablet (75 mcg total) by mouth before breakfast.  Dispense: 90 tablet; Refill: 3  4. Screening for prostate cancer  Assessment & Plan:  Check psa    Orders:  -     PSA Total, Screen; Future; Expected date: 01/08/2025  5. Well adult exam  Assessment & Plan:  Screening labs ordered  Please aim to eat a diet high in fresh fruits and vegetables, lean protein sources, complex carbohydrates and limited processed and fast foods.  Try to get at least 150 minutes of exercise per week-a combination of weight resistance and cardio is preferred.    Declines vaccines  Up to date colon ca screening  Psa ordered  Orders:  -     CBC, Platelet; No Differential; Future; Expected date: 01/08/2025  -     Comp Metabolic Panel (14); Future; Expected date: 01/08/2025  -     Hemoglobin A1C; Future; Expected date: 01/08/2025  -     Lipid Panel; Future; Expected date: 01/08/2025  -     PSA Total, Screen; Future; Expected date: 01/08/2025  -     TSH W Reflex To Free T4; Future; Expected date: 01/08/2025  6. Hx of CABG  Assessment & Plan:  Sees cardiology  Denies symptoms  Continue present management  Orders:  -     Metoprolol Succinate ER; Take 1 tablet (50 mg total) by mouth daily.  Dispense: 90 tablet; Refill: 3  -     Ramipril; Take 1 capsule (1.25 mg total) by mouth daily.  Dispense: 90 capsule; Refill: 3  -     Rosuvastatin Calcium; Take 1 tablet (40 mg total) by mouth every evening.  Dispense: 90 tablet; Refill: 3  7. Gastroesophageal reflux disease, unspecified whether esophagitis present  Assessment & Plan:  Discussed diet-avoid spicy and acidic foods  Continue present management  Please call if symptoms worsen or are not resolving.    Orders:  -     Omeprazole; Take 1 capsule (20 mg total) by mouth daily.  Dispense: 90 capsule; Refill: 3  8. Tremor of both hands  Assessment & Plan:  Refer neuro for further  evaluation-concern for Parkinson's disease  Orders:  -     NEURO - INTERNAL  9. Memory loss  Assessment & Plan:  Refer neuro for further evaluation    Orders:  -     NEURO - INTERNAL  10. Urinary frequency  Assessment & Plan:  U/a w culture reflex  Refer urology  Orders:  -     UROLOGY - INTERNAL  -     Urinalysis with Culture Reflex  11. Gait disturbance  -     NEURO - INTERNAL  Other orders  -     Aspirin; Take 1 tablet (81 mg total) by mouth daily.  Dispense: 90 tablet; Refill: 3    The patient indicates understanding of these issues and agrees to the plan.        No follow-ups on file.     Aleta Wong, APRN, 1/8/2025     Supplementary Documentation:   General Health:  In the past six months, have you lost more than 10 pounds without trying?: (Patient-Rptd) 2 - No  Has your appetite been poor?: (Patient-Rptd) No  Type of Diet: (Patient-Rptd) Balanced;Diabetic  How does the patient maintain a good energy level?: (Patient-Rptd) Daily Walks  How would you describe your daily physical activity?: (Patient-Rptd) Light  How would you describe your current health state?: (Patient-Rptd) Fair  How do you maintain positive mental well-being?: (Patient-Rptd) Puzzles;Games;Visiting Friends;Visiting Family  On a scale of 0 to 10, with 0 being no pain and 10 being severe pain, what is your pain level?: (Patient-Rptd) 0 - (None)  In the past six months, have you experienced urine leakage?: (Patient-Rptd) 0-No  At any time do you feel concerned for the safety/well-being of yourself and/or your children, in your home or elsewhere?: No  Have you had any immunizations at another office such as Influenza, Hepatitis B, Tetanus, or Pneumococcal?: (Patient-Rptd) No    Health Maintenance   Topic Date Due    Diabetes Care Dilated Eye Exam  Never done    Zoster Vaccines (2 of 3) 02/26/2010    COVID-19 Vaccine (3 - 2024-25 season) 09/01/2024    Diabetes Care A1C  09/05/2024    Influenza Vaccine (1) 10/01/2024    Annual Depression  Screening  01/01/2025    Diabetes Care: Foot Exam (Annual)  01/01/2025    Diabetes Care: Microalb/Creat Ratio (Annual)  01/01/2025    Annual Physical  03/05/2025    Pneumococcal Vaccine: 50+ Years (3 of 3 - PCV20 or PCV21) 11/18/2025    Diabetes Care: GFR  03/05/2025    PSA  03/05/2026    Colorectal Cancer Screening  11/09/2028    Fall Risk Screening (Annual)  Completed    Meningococcal B Vaccine  Aged Out

## 2025-01-08 NOTE — TELEPHONE ENCOUNTER
Pharmacy not accepting order sent with patient to turn in for DM testing supplies. See orders tab - DME order signed and given to patient, pharmacy not accepting.     Needs to be sent electronically.

## 2025-01-13 DIAGNOSIS — E03.9 ACQUIRED HYPOTHYROIDISM: Primary | ICD-10-CM

## 2025-01-13 RX ORDER — LEVOTHYROXINE SODIUM 100 UG/1
100 TABLET ORAL
Qty: 30 TABLET | Refills: 3 | Status: SHIPPED | OUTPATIENT
Start: 2025-01-13

## 2025-01-13 NOTE — ASSESSMENT & PLAN NOTE
Pt never got hearing aids due to cost-son was going to recheck cost at one point  Will follow up up

## 2025-01-13 NOTE — ASSESSMENT & PLAN NOTE
Continue present management  Refill diabetic testing supplies  Diabetic labs ordered.   Diabetic foot exam completed  Sees podiatry routinely  Has dm eye appointment in July-encourage to send records

## 2025-01-13 NOTE — ASSESSMENT & PLAN NOTE
>>ASSESSMENT AND PLAN FOR ELEVATED TSH WRITTEN ON 1/13/2025 11:41 AM BY ALAN OSMAN APRN    Check tsh levels  cpm

## 2025-01-13 NOTE — ASSESSMENT & PLAN NOTE
Screening labs ordered  Please aim to eat a diet high in fresh fruits and vegetables, lean protein sources, complex carbohydrates and limited processed and fast foods.  Try to get at least 150 minutes of exercise per week-a combination of weight resistance and cardio is preferred.    Declines vaccines  Up to date colon ca screening  Psa ordered

## 2025-01-13 NOTE — ASSESSMENT & PLAN NOTE
Discussed diet-avoid spicy and acidic foods  Continue present management  Please call if symptoms worsen or are not resolving.

## 2025-03-07 ENCOUNTER — OFFICE VISIT (OUTPATIENT)
Dept: SURGERY | Facility: CLINIC | Age: 70
End: 2025-03-07
Payer: MEDICARE

## 2025-03-07 DIAGNOSIS — R39.9 LOWER URINARY TRACT SYMPTOMS: Primary | ICD-10-CM

## 2025-03-07 PROCEDURE — 1160F RVW MEDS BY RX/DR IN RCRD: CPT | Performed by: UROLOGY

## 2025-03-07 PROCEDURE — 99204 OFFICE O/P NEW MOD 45 MIN: CPT | Performed by: UROLOGY

## 2025-03-07 PROCEDURE — 1159F MED LIST DOCD IN RCRD: CPT | Performed by: UROLOGY

## 2025-03-07 RX ORDER — TAMSULOSIN HYDROCHLORIDE 0.4 MG/1
0.4 CAPSULE ORAL DAILY
Qty: 90 CAPSULE | Refills: 3 | Status: SHIPPED | OUTPATIENT
Start: 2025-03-07 | End: 2026-03-02

## 2025-03-07 NOTE — PROGRESS NOTES
Carla Sims MD  Department of Urology  [address] [place] [address] Rd[address]  [place] [address]    T: [phone]  F: [phone]    To: No primary care provider on file.   No primary provider on file.    Re: Mian Gomez   MRN: [id number]  : 1955    Dear No primary care provider on file.,    Today I had the pleasure of seeing Mian Gomez in my clinic. As you know, Mr. Gomez is PSA was obtained that was 2.02.  He also had a urine analysis that was negative.  His hemoglobin A1c was 6.1% and his creatinine was 1.04.  Pleasant 69 year old year old male who I am seeing for LUTS. Patient was last seen in this department on Visit date not found.    Briefly, patient was noted to have weak stream, hesitancy, urgency, frequency most recently at his primary care provider appointment.  He also reported nocturia.         PAST MEDICAL HISTORY:  Past Medical History:    Acute confusion    Coronary atherosclerosis    Diabetes (HCC)    Essential hypertension    Heart disease    High blood pressure    High cholesterol    Hyperlipidemia        PAST SURGICAL HISTORY:  Past Surgical History:   Procedure Laterality Date    Cabg      Colonoscopy N/A 2023    Procedure: COLONOSCOPY;  Surgeon: Scott Garcia MD;  Location: [facility] MAIN OR         ALLERGIES:  Allergies[1]      MEDICATIONS:  Current Outpatient Medications   Medication Instructions    Alcohol Swabs 70 % Does not apply Pads 1 Pad, Topical, 2 times daily, PRIOR TO EACH FINGER STICK    aspirin (ASPIRIN 81) 81 mg, Oral, Daily    Blood Glucose Calibration (ONETOUCH ULTRA CONTROL) In Vitro Liquid 1 drop, In Vitro, As needed, For blood glucose monitoring.    Blood Glucose Monitoring Suppl (ONETOUCH ULTRA 2) w/Device Does not apply Kit 1 kit, Does not apply, 2 times daily, For blood glucose monitoring.    Glucose Blood (ONETOUCH ULTRA) In Vitro Strip 1 each, In Vitro, 2 times daily, For blood glucose monitoring.    Lancets (ONETOUCH DELICA PLUS  [id number]) Does not apply Misc 1 Lancet, Finger stick, 2 times daily, For blood glucose monitoring.    levothyroxine (SYNTHROID) 75 mcg, Oral, Before breakfast    levothyroxine (SYNTHROID) 100 mcg, Oral, Before breakfast    metFORMIN (GLUCOPHAGE) 500 mg, Oral, 2 times daily, Pt is due for labs    metoprolol succinate ER (TOPROL XL) 50 mg, Oral, Daily    omeprazole (PRILOSEC) 20 mg, Oral, Daily    ramipril (ALTACE) 1.25 mg, Oral, Daily    rosuvastatin (CRESTOR) 40 mg, Oral, Every evening        FAMILY HISTORY:  No family history on file.     SOCIAL HISTORY:  Social History     Socioeconomic History    Marital status:    Tobacco Use    Smoking status: Never    Smokeless tobacco: Never   Vaping Use    Vaping status: Never Used   Substance and Sexual Activity    Alcohol use: Not Currently    Drug use: Not Currently     Social Drivers of Health     Food Insecurity: No Food Insecurity (1/8/2025)    NCSS - Food Insecurity     Worried About Running Out of Food in the Last Year: No     Ran Out of Food in the Last Year: No   Transportation Needs: No Transportation Needs (1/8/2025)    NCSS - Transportation     Lack of Transportation: No   Housing Stability: Not At Risk (1/8/2025)    NCSS - Housing/Utilities     Has Housing: Yes     Worried About Losing Housing: No     Unable to Get Utilities: No          PHYSICAL EXAMINATION:  There were no vitals filed for this visit.  CONSTITUTIONAL: No apparent distress, cooperative and communicative  NEUROLOGIC: Alert and oriented   HEAD: Normocephalic, atraumatic   EYES: Sclera non-icteric   ENT: Hearing intact, moist mucous membranes   NECK: No obvious goiter or masses   RESPIRATORY: Normal respiratory effort, Nonlabored breathing on room air  SKIN: No evident rashes   ABDOMEN: Soft, nontender, nondistended, no rebound tenderness, no guarding, no masses      REVIEW OF SYSTEMS:    A comprehensive 10-point review of systems was completed.  Pertinent positives and negatives are noted  in the the HPI.       LABORATORY DATA:      Component  Ref Range & Units 1/8/25  9:45 AM   Glucose  70 - 99 mg/dL 124 High    Sodium  136 - 145 mmol/L 140   Potassium  3.5 - 5.1 mmol/L 4.6   Chloride  98 - 112 mmol/L 104   CO2  21.0 - 32.0 mmol/L 29.0   Anion Gap  0 - 18 mmol/L 7   BUN  9 - 23 mg/dL 14   Creatinine  0.70 - 1.30 mg/dL 1.07   BUN/CREA Ratio  10.0 - 20.0 13.1   Calcium, Total  8.7 - 10.4 mg/dL 10.5 High    Calculated Osmolality  275 - 295 mOsm/kg 292   eGFR-Cr  >=60 mL/min/1.73m2 76   ALT  10 - 49 U/L 23   AST  <34 U/L 21   Alkaline Phosphatase  45 - 117 U/L 64   Bilirubin, Total  0.2 - 1.1 mg/dL 0.6   Total Protein  5.7 - 8.2 g/dL 7.5   Albumin  3.2 - 4.8 g/dL 5.1 High    Globulin  2.0 - 3.5 g/dL 2.4   A/G Ratio  1.0 - 2.0 2.1 High    Patient Fasting for CMP? Yes           Ref Range & Units 1/8/25  9:45 AM   WBC  4.0 - 11.0 x10(3) uL 6.7   RBC  3.80 - 5.80 x10(6)uL 4.73   HGB  13.0 - 17.5 g/dL 14.1   HCT  39.0 - 53.0 % 42.9   MCV  80.0 - 100.0 fL 90.7   MCH  26.0 - 34.0 pg 29.8   MCHC  31.0 - 37.0 g/dL 32.9   RDW  11.0 - 15.0 % 12.7   RDW-SD  35.1 - 46.3 fL 41.9   PLT  150.0 - 450.0 10(3)uL 157.0         Component  Ref Range & Units 1/8/25  9:45 AM   HgbA1C  <5.7 % 6.1 High    Comment:  Normal HbA1C:     <5.7%   Pre-Diabetic:     5.7 - 6.4%   Diabetic:         >6.4%   Diabetic Control: <7.0%     Estimated Average Glucose  68 - 126 mg/dL 128 High            IMAGING REVIEW:  Narrative   PROCEDURE: XR WRIST COMPLETE (MIN 3 VIEWS), RIGHT (CPT=73110)     COMPARISON: [facility], XR WRIST COMPLETE (MIN 3 VIEWS), RIGHT (CPT=73110), 8/01/2022, 6:20 PM.     INDICATIONS: Follow up right radial wrist pain post-fall x4 weeks.     TECHNIQUE: 3 views were obtained.       FINDINGS:  BONES: Nondisplaced dorsal triquetral avulsion fracture.  SOFT TISSUES: Overlying soft tissue swelling has diminished  EFFUSION: None visible.  OTHER: Negative.               Impression   CONCLUSION:  1. Nondisplaced dorsal  triquetral avulsion fracture.    2. Overlying soft tissue swelling has diminished           Dictated by (CST): David Benitez MD on 8/30/2022 at 3:15 PM      Finalized by (CST): David Benitez MD on 8/30/2022 at 3:18 PM              OTHER RELEVANT DATA:   none     IMPRESSION: Lower urinary tract symptoms namely urgency and frequency-recommend behavioral management.  Offered tamsulosin 0.4 mg nightly if no improvement can consider cystoscopy to start.  May need antimuscarinic or beta 3 agonist if primarily irritative symptoms refractory to tamsulosin    Behavioral management with timed voiding, double voiding, avoiding bladder irritants (coffee, tea, soda/pop, alcohol), voiding prior to bedtime, avoiding fluids 2-4 hours prior to bedtime, compression stockings and elevation of feet       We will also start tamsulosin 0.4mg at bedtime for his lower urinary tract symptoms. Side effects include dizziness and retrograde/anejaculation.       PLAN:  Behavioral management  Tamsulosin 0.4 mg nightly  RTC 3 months    Thank you for referring this very pleasant patient to my clinic. If you have any questions or concerns, please do not hesitate to contact me.    Sincerely,  Carla Sims MD    30 minutes were spent on this patient at this visit obtaining a history, reviewing medical records, developing a treatment plan, counseling and discussing treatment strategy with patient, coordination of care and documentation.     The 21st Century Cures Act makes medical notes available to patients in the interest of transparency.  However, please be advised that this is a medical document.  It is intended as a peer to peer communication.  It is written in medical language and may contain abbreviations or verbiage that are technical and unfamiliar.  It may appear blunt or direct.  Medical documents are intended to carry relevant information, facts as evident, and the clinical opinion of the practitioner.         [1] No Known  Allergies

## 2025-03-17 ENCOUNTER — OFFICE VISIT (OUTPATIENT)
Dept: NEUROLOGY | Facility: CLINIC | Age: 70
End: 2025-03-17
Payer: MEDICARE

## 2025-03-17 ENCOUNTER — LAB ENCOUNTER (OUTPATIENT)
Dept: LAB | Age: 70
End: 2025-03-17
Attending: Other
Payer: MEDICARE

## 2025-03-17 VITALS — WEIGHT: 150 LBS | HEIGHT: 60 IN | BODY MASS INDEX: 29.45 KG/M2

## 2025-03-17 DIAGNOSIS — G20.A1 PARKINSON'S DISEASE WITHOUT DYSKINESIA OR FLUCTUATING MANIFESTATIONS (HCC): Primary | ICD-10-CM

## 2025-03-17 DIAGNOSIS — G20.A1 PARKINSON'S DISEASE WITHOUT DYSKINESIA OR FLUCTUATING MANIFESTATIONS (HCC): ICD-10-CM

## 2025-03-17 LAB
CERULOPLASMIN SERPL-MCNC: 28 MG/DL (ref 20–60)
FOLATE SERPL-MCNC: 40 NG/ML (ref 5.4–?)
T PALLIDUM AB SER QL IA: NONREACTIVE
T4 FREE SERPL-MCNC: 1.4 NG/DL (ref 0.8–1.7)
TSI SER-ACNC: 8.92 UIU/ML (ref 0.55–4.78)
VIT B12 SERPL-MCNC: 608 PG/ML (ref 211–911)

## 2025-03-17 PROCEDURE — 86780 TREPONEMA PALLIDUM: CPT

## 2025-03-17 PROCEDURE — 1160F RVW MEDS BY RX/DR IN RCRD: CPT | Performed by: OTHER

## 2025-03-17 PROCEDURE — 84207 ASSAY OF VITAMIN B-6: CPT | Performed by: OTHER

## 2025-03-17 PROCEDURE — 99204 OFFICE O/P NEW MOD 45 MIN: CPT | Performed by: OTHER

## 2025-03-17 PROCEDURE — 82607 VITAMIN B-12: CPT

## 2025-03-17 PROCEDURE — 84443 ASSAY THYROID STIM HORMONE: CPT

## 2025-03-17 PROCEDURE — 82746 ASSAY OF FOLIC ACID SERUM: CPT

## 2025-03-17 PROCEDURE — 36415 COLL VENOUS BLD VENIPUNCTURE: CPT | Performed by: OTHER

## 2025-03-17 PROCEDURE — 84446 ASSAY OF VITAMIN E: CPT | Performed by: OTHER

## 2025-03-17 PROCEDURE — 82390 ASSAY OF CERULOPLASMIN: CPT

## 2025-03-17 PROCEDURE — 84439 ASSAY OF FREE THYROXINE: CPT

## 2025-03-17 PROCEDURE — 1159F MED LIST DOCD IN RCRD: CPT | Performed by: OTHER

## 2025-03-17 RX ORDER — CARBIDOPA AND LEVODOPA 25; 100 MG/1; MG/1
TABLET ORAL
Qty: 270 TABLET | Refills: 5 | Status: SHIPPED | OUTPATIENT
Start: 2025-03-17

## 2025-03-17 NOTE — PROGRESS NOTES
The following individual(s) verbally consented to be recorded using ambient AI listening technology and understand that they can each withdraw their consent to this listening technology at any point by asking the clinician to turn off or pause the recording:    Patient name: Mian Gomez  Additional names:      Patient decline

## 2025-03-17 NOTE — PROGRESS NOTES
[facility] NEUROSCIENCES [address], [address]  [facility] [address]  [phone]            Neurology Initial Visit     Referred By: Dr. Couch ref. provider found    Chief Complaint:   Chief Complaint   Patient presents with    Neurologic Problem     Patient presents here today to establish care, patient was referred by Aleta Wong APRN to evaluate and treat tremors and gait, patient c/o both hand tremors that makes it difficult to open things. Tremors have been worsening over time.       HPI:     Mian Gomez is a 69 year old male, who presents for tremors going on for couple of years.  Patient came to see me first time in March 2025, developing tremors for couple of years, both sides, primarily when he is trying to open some stuff or hold stuff.  But occasionally tremors even at rest.  He was developing acting out dreams at night, feeling stiffer, slower, having very occasional constipation but no sense of taste changes.    Some cognitive decline, especially with finding words.    Past Medical History:    Acute confusion    Coronary atherosclerosis    Diabetes (HCC)    Essential hypertension    Heart disease    High blood pressure    High cholesterol    Hyperlipidemia       Past Surgical History:   Procedure Laterality Date    Cabg  2012    Colonoscopy N/A 11/9/2023    Procedure: COLONOSCOPY;  Surgeon: Scott Garcia MD;  Location: [facility] MAIN OR       Social history:  History   Smoking Status    Never   Smokeless Tobacco    Never     History   Alcohol Use Not Currently     History   Drug Use Unknown       No family history on file.      Current Outpatient Medications:     carbidopa-levodopa  MG Oral Tab, Start with 1/2 pill TID for 1 week, then 1 pill in an and 1/2 pill at noon and pm for 1 week, then 1 pill in am and noon and 1/2 pill in pm for 1 week then 1 pill TID. 7 am , 12 pm, 5 pm, Disp: 270 tablet, Rfl: 5    tamsulosin 0.4 MG Oral Cap, Take 1 capsule  (0.4 mg total) by mouth daily. Take 1/2 hour following the same meal each day, Disp: 90 capsule, Rfl: 3    rosuvastatin 40 MG Oral Tab, Take 1 tablet (40 mg total) by mouth every evening., Disp: 90 tablet, Rfl: 3    levothyroxine 100 MCG Oral Tab, Take 1 tablet (100 mcg total) by mouth before breakfast., Disp: 30 tablet, Rfl: 3    aspirin (ASPIRIN 81) 81 MG Oral Tab EC, Take 1 tablet (81 mg total) by mouth daily., Disp: 90 tablet, Rfl: 3    levothyroxine 75 MCG Oral Tab, Take 1 tablet (75 mcg total) by mouth before breakfast., Disp: 90 tablet, Rfl: 3    metFORMIN 500 MG Oral Tab, Take 1 tablet (500 mg total) by mouth 2 (two) times daily. Pt is due for labs, Disp: 180 tablet, Rfl: 3    metoprolol succinate ER 50 MG Oral Tablet 24 Hr, Take 1 tablet (50 mg total) by mouth daily., Disp: 90 tablet, Rfl: 3    omeprazole 20 MG Oral Capsule Delayed Release, Take 1 capsule (20 mg total) by mouth daily., Disp: 90 capsule, Rfl: 3    ramipril 1.25 MG Oral Cap, Take 1 capsule (1.25 mg total) by mouth daily., Disp: 90 capsule, Rfl: 3    Alcohol Swabs 70 % Does not apply Pads, Apply 1 Pad topically 2 (two) times daily. PRIOR TO EACH FINGER STICK, Disp: 200 each, Rfl: 4    Lancets (ONETOUCH DELICA PLUS [id number]) Does not apply Misc, 1 Lancet by Finger stick route 2 (two) times daily. For blood glucose monitoring., Disp: 200 each, Rfl: 3    Blood Glucose Calibration (ONETOUCH ULTRA CONTROL) In Vitro Liquid, 1 drop by In Vitro route as needed (To use monthly, with each new vial of test strips, when you change your batteries, or if you suspect the meter is malfunctioning.). For blood glucose monitoring., Disp: 1 each, Rfl: 3    Blood Glucose Monitoring Suppl (ONETOUCH ULTRA 2) w/Device Does not apply Kit, 1 kit 2 (two) times daily. For blood glucose monitoring., Disp: 1 kit, Rfl: 0    Glucose Blood (ONETOUCH ULTRA) In Vitro Strip, 1 each by In Vitro route 2 (two) times daily. For blood glucose monitoring., Disp: 200 strip, Rfl:  3    Allergies[1]    ROS:   As in HPI, the rest of the 14 system review was done and was negative      Physical Exam:  Vitals:    03/17/25 1031   Weight: 150 lb (68 kg)   Height: 60\"       General: No apparent distress, well nourished, well groomed.  Head- Normocephalic, atraumatic  Eyes- No redness or swelling  ENT- Hearing intake, normal glutition  Neck- No masses or adenopathy  Cv: pulses were palpable and normal, no cyanosis or edema     Neurological:     Mental Status- Alert and oriented x3.  Normal attention span and concentration  Thought process intact  Memory intact- recent and remote  Mood intact  Fund of knowledge appropriate for education and age    Language intact including: comprehension, naming, repetition, vocabulary, however with bradyphrenia    Cranial Nerves:  II.-Visual fields full to confrontation        Fundoscopically- No papilledema or retinal hemorrhages. Normal optic discs, sharp edges.  III, IV, VI- EOM intact, KIERRA  V. Facial sensation and corneal reflexes intact  VII. Face symmetric, no facial weakness. Mild masked facies  VIII. Hearing intact to whisper, tuning fork or finger rub.  IX. Pallet elevates symmetrically.  XI. Shoulder shrug is intact  XII. Tongue is midline    Motor Exam:  Muscle tone increased on the right, cogwheel rigidity type.  Bradykinesia  No atrophy or fasciculations  Strength- upper extremities 5/5 proximally and distally                  - lower  extremities 5/5 proximally and distally    Sensory Exam:  Light touch sensation- intact in all 4 extremeties      Coordination:  Finger to nose intact  Heel to shin intact  Rapid alternating movements are slower on the right    Gait:  Stooped, decreased arm swing, more so on the right, short stride length, en block turning           Labs:    Lab Results   Component Value Date    TSH 10.600 (H) 01/08/2025     Lab Results   Component Value Date    HDL 35 (L) 01/08/2025    LDL 65 01/08/2025    TRIG 124 01/08/2025     Lab  Results   Component Value Date    HGB 14.1 01/08/2025    HCT 42.9 01/08/2025    MCV 90.7 01/08/2025    WBC 6.7 01/08/2025    .0 01/08/2025      Lab Results   Component Value Date    BUN 14 01/08/2025    CA 10.5 (H) 01/08/2025    ALT 23 01/08/2025    AST 21 01/08/2025    ALB 5.1 (H) 01/08/2025     01/08/2025    K 4.6 01/08/2025     01/08/2025    CO2 29.0 01/08/2025      I have reviewed labs.      Assessment   1. Parkinson's disease without dyskinesia or fluctuating manifestations (HCC)    Strong suspicion for possible Parkinson disease at this age and combination of symptoms.  Further workup will be done to rule out any other secondary causes.  Emphasized importance of exercise regimen.  We will start him on carbidopa-levodopa slowly tapering up the dose.  We talked about expectations and prognosis of the disease.    - MRI BRAIN (W+WO) (CPT=70553); Future  - Ceruloplasmin; Future  - Vitamin E, Serum  - Vitamin B6  - Vitamin B12; Future  - TSH W Reflex To Free T4; Future  - T Pallidum Screening Cascade; Future  - Folic Acid Serum (Folate); Future  -  NAVIGATOR           Education and counseling provided to patient. Instructed patient to call my office or seek medical attention immediately if symptoms worsen.  Patient verbalized understanding of information given. All questions were answered. All side effects of drugs were discussed.       Return to clinic in: Return in about 3 months (around 6/17/2025).    Woo Rushing MD           [1] No Known Allergies

## 2025-03-21 ENCOUNTER — TELEPHONE (OUTPATIENT)
Age: 70
End: 2025-03-21

## 2025-03-21 NOTE — TELEPHONE ENCOUNTER
Il team [facility] ha tentato di contattarti per dare seguito a un ordine effettuato dall'ufficio del dottJose Enrique Rushing. Ti preghiamo di richiamarci al krystle [phone] per discutere del coordinamento delle cure e delle risorse.    Questo messaggio è stato tradotto utilizzando [employer].

## 2025-03-23 LAB — VITAMIN B6: 54.3 UG/L

## 2025-03-27 LAB
VIT E ALPHA TOCO: 13.4 MG/L
VIT E GAMMA TOCO: 0.4 MG/L

## 2025-04-04 ENCOUNTER — TELEPHONE (OUTPATIENT)
Age: 70
End: 2025-04-04

## 2025-04-04 NOTE — TELEPHONE ENCOUNTER
Hello,  Sorry I missed you - I am reaching out from the [facility] department, following up on an order from your provider's office to assist in connecting you with resources for care. If you would like to discuss this further, please give us a call back at [phone], or for more immediate assistance you can contact our 24-hour help line at [phone] We look forward to hearing from you soon.

## 2025-04-05 DIAGNOSIS — E03.9 ACQUIRED HYPOTHYROIDISM: ICD-10-CM

## 2025-04-09 NOTE — TELEPHONE ENCOUNTER
Dear nursing staff,    Sent to nursing to verify patient if taking the correct dosage of levothyroxine. Nursing needs to follow up. Thank you!

## 2025-04-09 NOTE — TELEPHONE ENCOUNTER
Rn Triage - please call patient and clarify if  patient is taking the correct dose.     Levothyroxine 75 mcg - 1 year supply of refills was sent to [facility] on 01/08/2025.   Dose increased on 01/08/2025 to Levothyroxine 100 mcg.   Patient is also seeing Neurology  Dr. Woo Rushing.       Please see Lab notes from 01/08/2025:    Thyroid levels show thyroid is still underactive. Please increase levothyroxine to 100 mcg daily and recheck labs in 6 weeks.  Your liver, kidney and thryoid function is within normal limits.

## 2025-04-10 RX ORDER — LEVOTHYROXINE SODIUM 100 UG/1
100 TABLET ORAL
Qty: 90 TABLET | Refills: 3 | Status: SHIPPED | OUTPATIENT
Start: 2025-04-10

## 2025-04-10 NOTE — TELEPHONE ENCOUNTER
RN called [phone] (patient's contact number), but Evgeny (son (same name )-release of information) answered the phone. Informed son  about the levothyroxine 100 microgram that was sent to [facility] in  [place] today. No questions at this time.

## 2025-04-10 NOTE — TELEPHONE ENCOUNTER
[place]. Please see all messages below.  Patient had a TSH done on 3/17/2025 ordered by a different MD. Should patient continue the 75 mcg or ok for him to start the 100 mcg. Please advise.    Patient was called and he stated that he is currently taking  Levothyroxine 75 mcg. Patient stated that he was going to finish this dose and then he was going to start the 100 mcg dose.

## 2025-04-11 ENCOUNTER — TELEPHONE (OUTPATIENT)
Dept: ADMINISTRATIVE | Age: 70
End: 2025-04-11

## 2025-04-11 NOTE — TELEPHONE ENCOUNTER
Covering [employer] for alpha S    MRI BRAIN (W+WO) (CPT=70553)     [facility]  online to initiate authorization    Referral #: [id number]      Scheduled For: 04/14/2025    Status: pending authorization    [employer] Case Number: [id number]          Patient notified of pending status via [employer].     Appt Desk > Noted

## 2025-04-15 ENCOUNTER — TELEPHONE (OUTPATIENT)
Dept: SURGERY | Facility: CLINIC | Age: 70
End: 2025-04-15

## 2025-04-15 NOTE — TELEPHONE ENCOUNTER
Oh my goodness. I am not sure ill effects as we never recommend a TID dosing. Usually 0.8mg is the maximum dose     I would continue with once nightly dosing and see if improvement over additional 2-3 months      4/15/25    I called pt's son back and informed him of AR's response as stated below and he verbalized understanding and stated that he asked pt if he noticed any improvement in his urination and he told him that he is getting up less during the night than he was prior so he will pay for the Tamsulosin OOP with a Good RX coupon and make sure pt takes it nightly only.

## 2025-04-15 NOTE — TELEPHONE ENCOUNTER
I s/w pt's son and determined that pt was filling a multiple dose med box and accidentaly placed a Tamsulosin in each slot for breakfast, lunch and dinner. States the capsules looked similar than another med he takes. Pt did not experience any ill effects from the triple dose and son also states that the med did not cause any improvement on his urination symptoms as far as he knows so he is wondering if he should even get more of the Tamsulsoin and have pt pay for it OOP since he used 3 mo supply in 1 month. Son also asks if this error could have caused any long term effects on pt's liver or kidneys. I told pt that I will inform AR and get back to him if she has any advice.

## 2025-04-15 NOTE — TELEPHONE ENCOUNTER
Per son patient is taking tamsulosin and is supposed to take it once a day and states he has accidentally been taking it 3 times a day for a month. Please advise

## 2025-05-05 ENCOUNTER — PATIENT MESSAGE (OUTPATIENT)
Dept: NEUROLOGY | Facility: CLINIC | Age: 70
End: 2025-05-05

## 2025-05-06 ENCOUNTER — PATIENT MESSAGE (OUTPATIENT)
Dept: CASE MANAGEMENT | Age: 70
End: 2025-05-06

## 2025-05-06 ENCOUNTER — TELEPHONE (OUTPATIENT)
Dept: CASE MANAGEMENT | Age: 70
End: 2025-05-06

## 2025-05-06 NOTE — TELEPHONE ENCOUNTER
MRI Brain        Status: DENIED        Reference number [id number]     A copy of the denial letter is filed under the MEDIA tab, reference for complete details. You may reach out to [employer] at [phone] to discuss decision.     Please reach out to patient with plan of care.      Thank you

## 2025-05-07 NOTE — TELEPHONE ENCOUNTER
The source of your problem is uncertain. I am not certain about source of the problem   Response to your medicine is uncertain or incomplete.    You have unusual symptoms that cause your doctor to suspect you have a condition that is like Parkinson's disease. I do suspect Parkinson's disease.   Your condition was caused by the use of a medication.    Your doctor suspects you have a rare disease.

## 2025-05-07 NOTE — TELEPHONE ENCOUNTER
Noted of denial.  The physician does not do P2P's, so a LOMN/appeal will need to be submitted. JULIANA clinical, please provide the letter of appeal so that I may submit to the patients insurance. Thanks    Denial reason:  Your doctor told us that there is a concern related to how your body moves. An imaging study was asked for. We cannot approve this request because: Imaging can be done for one of these reasons.  The source of your problem is uncertain.  Response to your medicine is uncertain or incomplete.  You have unusual symptoms that cause your doctor to suspect you have a condition that is like Parkinson's disease.  Your condition was caused by the use of a medication.  Your doctor suspects you have a rare disease. The records we were sent do not describe one of these reasons.      Member ID: [id number]   Reference number [id number]   Fax: [phone] Attn: Christoph Medicare Part C Appeals & Grievances

## 2025-06-09 NOTE — ED INITIAL ASSESSMENT (HPI)
Pt in [address] [place] [address] for c/o fall 6 days ago when he tripped after taking out garbage. Pt reports was having R knee , R shoulder pain, and R hand pain. Pt also reports scratch to back/rib area. Pt also has area of healing bruise to R flank area states this is from fall to ground. Yes

## 2025-06-27 ENCOUNTER — OFFICE VISIT (OUTPATIENT)
Dept: SURGERY | Facility: CLINIC | Age: 70
End: 2025-06-27
Payer: MEDICARE

## 2025-06-27 DIAGNOSIS — R39.9 LOWER URINARY TRACT SYMPTOMS: Primary | ICD-10-CM

## 2025-06-27 PROCEDURE — 1159F MED LIST DOCD IN RCRD: CPT | Performed by: UROLOGY

## 2025-06-27 PROCEDURE — 1160F RVW MEDS BY RX/DR IN RCRD: CPT | Performed by: UROLOGY

## 2025-06-27 PROCEDURE — G2211 COMPLEX E/M VISIT ADD ON: HCPCS | Performed by: UROLOGY

## 2025-06-27 PROCEDURE — 99214 OFFICE O/P EST MOD 30 MIN: CPT | Performed by: UROLOGY

## 2025-06-27 RX ORDER — TAMSULOSIN HYDROCHLORIDE 0.4 MG/1
0.8 CAPSULE ORAL DAILY
Qty: 180 CAPSULE | Refills: 3 | Status: SHIPPED | OUTPATIENT
Start: 2025-06-27 | End: 2026-06-22

## 2025-06-27 NOTE — PROGRESS NOTES
Carla Sims MD  Department of Urology  [address] [place] [address] Rd[address]  [place] [address]    T: [phone]  F: [phone]    To: No primary care provider on file.   No primary provider on file.    Re: Mian Gomez   MRN: [id number]  : 1955    Dear No primary care provider on file.,    Today I had the pleasure of seeing Mian Gomez in my clinic. As you know, Mr. Gomez is a pleasant 69 year old year old male who I am seeing for followup. Patient was last seen in this department on 3/7/2025.    Briefly, patient was noted to have weak stream, hesitancy, urgency, frequency most recently at his primary care provider appointment.  He also reported nocturia.      I last saw patient on 3/7/2025.  At that time I recommended tamsulosin 0.4 mg nightly and return to clinic in 3 months.  I also recommended behavioral management.    Today he states that his nocturia is improved but he still has urgency and frequency throughout the daytime       PAST MEDICAL HISTORY:  Past Medical History[1]     PAST SURGICAL HISTORY:  Past Surgical History[2]      ALLERGIES:  Allergies[3]      MEDICATIONS:  Current Outpatient Medications   Medication Instructions    Alcohol Swabs 70 % Does not apply Pads 1 Pad, Topical, 2 times daily, PRIOR TO EACH FINGER STICK    aspirin (ASPIRIN 81) 81 mg, Oral, Daily    Blood Glucose Calibration (ONETOUCH ULTRA CONTROL) In Vitro Liquid 1 drop, In Vitro, As needed, For blood glucose monitoring.    Blood Glucose Monitoring Suppl (ONETOUCH ULTRA 2) w/Device Does not apply Kit 1 kit, Does not apply, 2 times daily, For blood glucose monitoring.    carbidopa-levodopa  MG Oral Tab Start with 1/2 pill TID for 1 week, then 1 pill in an and 1/2 pill at noon and pm for 1 week, then 1 pill in am and noon and 1/2 pill in pm for 1 week then 1 pill TID. 7 am , 12 pm, 5 pm    Glucose Blood (ONETOUCH ULTRA) In Vitro Strip 1 each, In Vitro, 2 times daily, For blood glucose monitoring.     Lancets (ONETOUCH DELICA PLUS [id number]) Does not apply Misc 1 Lancet, Finger stick, 2 times daily, For blood glucose monitoring.    levothyroxine (SYNTHROID) 100 mcg, Oral, Before breakfast    metFORMIN (GLUCOPHAGE) 500 mg, Oral, 2 times daily, Pt is due for labs    metoprolol succinate ER (TOPROL XL) 50 mg, Oral, Daily    omeprazole (PRILOSEC) 20 mg, Oral, Daily    ramipril (ALTACE) 1.25 mg, Oral, Daily    rosuvastatin (CRESTOR) 40 mg, Oral, Every evening    tamsulosin (FLOMAX) 0.4 mg, Oral, Daily, Take 1/2 hour following the same meal each day        FAMILY HISTORY:  Family History[4]     SOCIAL HISTORY:  Short Social Hx on File[5]       PHYSICAL EXAMINATION:  There were no vitals filed for this visit.  CONSTITUTIONAL: No apparent distress, cooperative and communicative  NEUROLOGIC: Alert and oriented   HEAD: Normocephalic, atraumatic   EYES: Sclera non-icteric   ENT: Hearing intact, moist mucous membranes   NECK: No obvious goiter or masses   RESPIRATORY: Normal respiratory effort, Nonlabored breathing on room air  SKIN: No evident rashes   ABDOMEN: no obvious masses, no obvious distension      REVIEW OF SYSTEMS:    A comprehensive 10-point review of systems was completed.  Pertinent positives and negatives are noted in the the HPI.       LABORATORY DATA:  Prostate Specific Antigen Screen 2.02   Comment: INTERPRETIVE INFORMATION: TOTAL PROSTATE SP     gbA1C 6.1 High    Comment:  Normal HbA1C:     <5.7%   Pre-Diabetic:     5.7 - 6.4%   Diabetic:         >6.4%   Diabetic Control: <7.0%     Estimated Average Glucose 128 High      Glucose 124 High    Sodium 140   Potassium 4.6   Chloride 104   CO2 29.0   Anion Gap 7   BUN 14   Creatinine 1.07   BUN/CREA Ratio 13.1   Calcium, Total 10.5 High    Calculated Osmolality 292   eGFR-Cr 76   ALT 23   AST 21   Alkaline Phosphatase 64   Bilirubin, Total 0.6   Total Protein 7.5   Albumin 5.1 High    Globulin 2.4   A/G Ratio 2.1 High          Component  Ref Range & Units  (hover) 1/8/25  9:45 AM   WBC 6.7   RBC 4.73   HGB 14.1   HCT 42.9   MCV 90.7   MCH 29.8   MCHC 32.9   RDW 12.7   RDW-SD 41.9   .0              IMAGING REVIEW:  Narrative   PROCEDURE: XR WRIST COMPLETE (MIN 3 VIEWS), RIGHT (CPT=73110)     COMPARISON: [facility], XR WRIST COMPLETE (MIN 3 VIEWS), RIGHT (CPT=73110), 8/01/2022, 6:20 PM.     INDICATIONS: Follow up right radial wrist pain post-fall x4 weeks.     TECHNIQUE: 3 views were obtained.       FINDINGS:  BONES: Nondisplaced dorsal triquetral avulsion fracture.  SOFT TISSUES: Overlying soft tissue swelling has diminished  EFFUSION: None visible.  OTHER: Negative.               Impression   CONCLUSION:  1. Nondisplaced dorsal triquetral avulsion fracture.    2. Overlying soft tissue swelling has diminished           Dictated by (CST): David Benitez MD on 8/30/2022 at 3:15 PM      Finalized by (CST): David Benitez MD on 8/30/2022 at 3:18 PM              OTHER RELEVANT DATA:   none     IMPRESSION: Lower urinary tract symptoms currently on tamsulosin with moderate effect.  We will increase it to 0.8 mg nightly.  If there is no improvement we can either consider adding antimuscarinic or beta 3 agonist (depending on his PVR) versus proceeding with diagnostic testing to see if he is a candidate for any advanced management/surgical treatment options     PLAN:  Tamsulosin 0.8 mg nightly  Behavioral management  Return to clinic in 2-1/2 months  PVR at next visit    Thank you for referring this very pleasant patient to my clinic. If you have any questions or concerns, please do not hesitate to contact me.    Sincerely,  Carla Sims MD    30 minutes were spent on this patient at this visit obtaining a history, reviewing medical records, developing a treatment plan, counseling and discussing treatment strategy with patient, coordination of care and documentation.     The 21st Century Cures Act makes medical notes available to patients in  the interest of transparency.  However, please be advised that this is a medical document.  It is intended as a peer to peer communication.  It is written in medical language and may contain abbreviations or verbiage that are technical and unfamiliar.  It may appear blunt or direct.  Medical documents are intended to carry relevant information, facts as evident, and the clinical opinion of the practitioner.         [1]   Past Medical History:   Acute confusion    Coronary atherosclerosis    Diabetes (HCC)    Essential hypertension    Heart disease    High blood pressure    High cholesterol    Hyperlipidemia   [2]   Past Surgical History:  Procedure Laterality Date    Cabg  2012    Colonoscopy N/A 11/9/2023    Procedure: COLONOSCOPY;  Surgeon: Scott Garcia MD;  Location: [facility] MAIN OR   [3] No Known Allergies  [4] No family history on file.  [5]   Social History  Socioeconomic History    Marital status:    Tobacco Use    Smoking status: Never    Smokeless tobacco: Never   Vaping Use    Vaping status: Never Used   Substance and Sexual Activity    Alcohol use: Not Currently    Drug use: Not Currently     Social Drivers of Health     Food Insecurity: No Food Insecurity (1/8/2025)    NCSS - Food Insecurity     Worried About Running Out of Food in the Last Year: No     Ran Out of Food in the Last Year: No   Transportation Needs: No Transportation Needs (1/8/2025)    NCSS - Transportation     Lack of Transportation: No   Housing Stability: Not At Risk (1/8/2025)    NCSS - Housing/Utilities     Has Housing: Yes     Worried About Losing Housing: No     Unable to Get Utilities: No

## 2025-07-11 RX ORDER — BLOOD SUGAR DIAGNOSTIC
1 STRIP MISCELLANEOUS 2 TIMES DAILY
Qty: 200 STRIP | Refills: 3 | Status: CANCELLED | OUTPATIENT
Start: 2025-07-11

## 2025-07-11 RX ORDER — BLOOD-GLUCOSE METER
1 EACH MISCELLANEOUS 2 TIMES DAILY
Qty: 1 KIT | Refills: 0 | Status: CANCELLED | OUTPATIENT
Start: 2025-07-11

## 2025-07-11 RX ORDER — LANCETS 33 GAUGE
1 EACH MISCELLANEOUS 2 TIMES DAILY
Qty: 200 EACH | Refills: 3 | Status: CANCELLED | OUTPATIENT
Start: 2025-07-11

## 2025-07-11 RX ORDER — BLOOD-GLUCOSE CONTROL, NORMAL
1 EACH MISCELLANEOUS AS NEEDED
Qty: 1 EACH | Refills: 3 | Status: CANCELLED | OUTPATIENT
Start: 2025-07-11

## 2025-07-15 RX ORDER — GLUCOSAMINE HCL/CHONDROITIN SU 500-400 MG
1 CAPSULE ORAL 2 TIMES DAILY
Qty: 200 EACH | Refills: 3 | Status: SHIPPED | OUTPATIENT
Start: 2025-07-15

## 2025-07-15 RX ORDER — BLOOD-GLUCOSE CONTROL, NORMAL
1 EACH MISCELLANEOUS AS NEEDED
Qty: 1 EACH | Refills: 3 | Status: SHIPPED | OUTPATIENT
Start: 2025-07-15

## 2025-07-15 RX ORDER — LANCETS 33 GAUGE
1 EACH MISCELLANEOUS 2 TIMES DAILY
Qty: 200 EACH | Refills: 3 | Status: CANCELLED | OUTPATIENT
Start: 2025-07-15

## 2025-07-15 RX ORDER — BLOOD SUGAR DIAGNOSTIC
1 STRIP MISCELLANEOUS 2 TIMES DAILY
Qty: 200 STRIP | Refills: 3 | Status: CANCELLED | OUTPATIENT
Start: 2025-07-15

## 2025-07-15 RX ORDER — BLOOD SUGAR DIAGNOSTIC
1 STRIP MISCELLANEOUS 2 TIMES DAILY
Qty: 200 STRIP | Refills: 3 | Status: SHIPPED | OUTPATIENT
Start: 2025-07-15

## 2025-07-15 RX ORDER — BLOOD-GLUCOSE METER
1 EACH MISCELLANEOUS AS DIRECTED
Qty: 1 KIT | Refills: 3 | Status: SHIPPED | OUTPATIENT
Start: 2025-07-15

## 2025-07-15 RX ORDER — LANCETS 33 GAUGE
1 EACH MISCELLANEOUS 2 TIMES DAILY
Qty: 200 EACH | Refills: 3 | Status: SHIPPED | OUTPATIENT
Start: 2025-07-15

## 2025-07-15 NOTE — TELEPHONE ENCOUNTER
Patients son Danielle on [employer] is requesting a whole new diabetic monitoring kit, patient would need new machine, lancets, test strips. Patient son said the current machine has a button that is stuck and not working. Please advise         Verified [facility] in [place]

## 2025-07-15 NOTE — TELEPHONE ENCOUNTER
Refill passed per [facility] protocols.    Requested Prescriptions   Pending Prescriptions Disp Refills    Alcohol Swabs 70 % Does not apply Pads 200 each 3     Sig: Apply 1 Pad topically 2 (two) times daily. PRIOR TO EACH FINGER STICK       There is no refill protocol information for this order       Blood Glucose Calibration (ONETOUCH ULTRA CONTROL) In Vitro Liquid 1 each 3     Si drop by In Vitro route as needed (To use monthly, with each new vial of test strips, when you change your batteries, or if you suspect the meter is malfunctioning.). For blood glucose monitoring.       Diabetic Supplies Protocol Passed - 7/15/2025  4:56 PM       Glucose Blood (ONETOUCH ULTRA) In Vitro Strip 200 strip 3     Si each by In Vitro route 2 (two) times daily. For blood glucose monitoring.       Diabetic Supplies Protocol Passed - 7/15/2025  4:56 PM       Lancets (ONETOUCH DELICA PLUS [id number]) Does not apply Misc 200 each 3     Si Lancet by Finger stick route 2 (two) times daily. For blood glucose monitoring.       Diabetic Supplies Protocol Passed - 7/15/2025  4:56 PM

## 2025-07-15 NOTE — TELEPHONE ENCOUNTER
Please review;    Patients adriane Santos on [employer] is requesting a whole new diabetic monitoring kit, patient would need new machine, lancets, test strips. Patient son said the current machine has a button that is stuck and not working. Please advise            Verified [facility] in [place]

## 2025-07-15 NOTE — TELEPHONE ENCOUNTER
One touch ultra machine sent.  Patient requesting pharmacy change to [facility] for Test strips, lancets, and alcohol swabs have year + supply of refills to last until 3/2026.

## 2025-08-01 ENCOUNTER — HOSPITAL ENCOUNTER (OUTPATIENT)
Dept: CV DIAGNOSTICS | Facility: HOSPITAL | Age: 70
Discharge: HOME | End: 2025-08-01
Attending: INTERNAL MEDICINE

## 2025-08-01 DIAGNOSIS — I25.10 CAD (CORONARY ARTERY DISEASE), NATIVE CORONARY ARTERY: ICD-10-CM

## 2025-08-01 DIAGNOSIS — Z95.1 AORTOCORONARY BYPASS STATUS: ICD-10-CM

## 2025-08-01 DIAGNOSIS — E78.5 HYPERLIPIDEMIA, UNSPECIFIED HYPERLIPIDEMIA TYPE: ICD-10-CM

## 2025-08-01 PROCEDURE — 93306 TTE W/DOPPLER COMPLETE: CPT | Performed by: INTERNAL MEDICINE

## 2025-08-15 ENCOUNTER — TELEPHONE (OUTPATIENT)
Dept: FAMILY MEDICINE CLINIC | Facility: CLINIC | Age: 70
End: 2025-08-15

## 2025-08-18 ENCOUNTER — OFFICE VISIT (OUTPATIENT)
Dept: NEUROLOGY | Facility: CLINIC | Age: 70
End: 2025-08-18

## 2025-08-18 ENCOUNTER — LAB ENCOUNTER (OUTPATIENT)
Dept: LAB | Age: 70
End: 2025-08-18
Attending: NURSE PRACTITIONER

## 2025-08-18 DIAGNOSIS — E03.9 ACQUIRED HYPOTHYROIDISM: ICD-10-CM

## 2025-08-18 DIAGNOSIS — G20.A1 PARKINSON'S DISEASE WITHOUT DYSKINESIA OR FLUCTUATING MANIFESTATIONS (HCC): Primary | ICD-10-CM

## 2025-08-18 LAB
T4 FREE SERPL-MCNC: 1.4 NG/DL (ref 0.8–1.7)
TSI SER-ACNC: 7.22 UIU/ML (ref 0.55–4.78)

## 2025-08-18 PROCEDURE — 36415 COLL VENOUS BLD VENIPUNCTURE: CPT

## 2025-08-18 PROCEDURE — 1160F RVW MEDS BY RX/DR IN RCRD: CPT | Performed by: OTHER

## 2025-08-18 PROCEDURE — 1159F MED LIST DOCD IN RCRD: CPT | Performed by: OTHER

## 2025-08-18 PROCEDURE — 99214 OFFICE O/P EST MOD 30 MIN: CPT | Performed by: OTHER

## 2025-08-18 PROCEDURE — 84439 ASSAY OF FREE THYROXINE: CPT

## 2025-08-18 PROCEDURE — 84443 ASSAY THYROID STIM HORMONE: CPT

## 2025-08-18 PROCEDURE — G2211 COMPLEX E/M VISIT ADD ON: HCPCS | Performed by: OTHER

## 2025-08-18 RX ORDER — AMOXICILLIN 500 MG/1
TABLET, FILM COATED ORAL
COMMUNITY
Start: 2025-03-12

## 2025-08-18 RX ORDER — CARBIDOPA AND LEVODOPA 25; 100 MG/1; MG/1
TABLET ORAL
Qty: 270 TABLET | Refills: 5 | Status: SHIPPED | OUTPATIENT
Start: 2025-08-18